# Patient Record
Sex: FEMALE | Race: WHITE | URBAN - METROPOLITAN AREA
[De-identification: names, ages, dates, MRNs, and addresses within clinical notes are randomized per-mention and may not be internally consistent; named-entity substitution may affect disease eponyms.]

---

## 2017-01-11 ENCOUNTER — TRANSFERRED RECORDS (OUTPATIENT)
Dept: HEALTH INFORMATION MANAGEMENT | Facility: CLINIC | Age: 57
End: 2017-01-11

## 2017-04-19 ENCOUNTER — TRANSFERRED RECORDS (OUTPATIENT)
Dept: HEALTH INFORMATION MANAGEMENT | Facility: CLINIC | Age: 57
End: 2017-04-19

## 2017-06-10 ENCOUNTER — HEALTH MAINTENANCE LETTER (OUTPATIENT)
Age: 57
End: 2017-06-10

## 2017-08-14 ENCOUNTER — PRE VISIT (OUTPATIENT)
Dept: ORTHOPEDICS | Facility: CLINIC | Age: 57
End: 2017-08-14

## 2017-08-14 NOTE — TELEPHONE ENCOUNTER
1.  Date/reason for appt:  10/26/17   Right Hip Pain    2.  Referring provider:  Self    3.  Call to patient (Yes / No - short description):  No, transferred from .  Previously seen at [facility] Dr Alonso - records, PT, MRI; no previous surgery on right hip    Emailed LEANNE mark [email or web address]

## 2017-08-23 NOTE — TELEPHONE ENCOUNTER
Records received from [facility].   Included  Office notes: 5/24/16, 6/22/16, 1/11/17  PT/OT notes: 4/19/17, 2/24/17, 2/21/17, 2/14/17, 2/9/17, 2/3/17  Radiology reports: right hip 6/8/16  MRI lumbar 6/8/16, 5/24/16

## 2017-08-28 NOTE — TELEPHONE ENCOUNTER
Received imaging disc [facility], sent to [facility]    MRI Right Hip

## 2017-10-25 DIAGNOSIS — M25.551 HIP PAIN, RIGHT: Primary | ICD-10-CM

## 2017-10-25 ASSESSMENT — ENCOUNTER SYMPTOMS
STIFFNESS: 1
BACK PAIN: 1
JOINT SWELLING: 0
MYALGIAS: 0
MUSCLE WEAKNESS: 0
NECK PAIN: 0
MUSCLE CRAMPS: 0
ARTHRALGIAS: 1

## 2017-10-25 ASSESSMENT — ACTIVITIES OF DAILY LIVING (ADL)
ADL_SUM: 36
ADL_MEAN: 2.11
ADL_SUBSCALE_SCORE: 47.05

## 2017-10-25 ASSESSMENT — HOOS S4: HOW SEVERE IS YOUR HIP JOINT STIFFNESS AFTER FIRST WAKENING IN THE MORNING?: SEVERE

## 2017-10-26 ENCOUNTER — OFFICE VISIT (OUTPATIENT)
Dept: ORTHOPEDICS | Facility: CLINIC | Age: 57
End: 2017-10-26

## 2017-10-26 VITALS — HEIGHT: 69 IN | BODY MASS INDEX: 28.38 KG/M2 | WEIGHT: 191.6 LBS

## 2017-10-26 DIAGNOSIS — M16.11 PRIMARY OSTEOARTHRITIS OF RIGHT HIP: Primary | ICD-10-CM

## 2017-10-26 NOTE — PROGRESS NOTES
Lorie returns today for her hips. She is five years s/p left total hip five years ago and reports that she is doing well. She reports that her right hip has become severely painful over the last few years and is now interfering with activities of daily living. She has tried activity modification and Rx dose NSAIDs as well as a medrol dose pack. She has been working with a physical therapist and with a HEP for the last eight months. She had an outside MRI this showed end-stage arthrosis. She is here to discuss total hip.     On physical examination she rises a little slowly and walks with an antalgic gait favoring the right side. Right hip ROM is moderately irritated and this reproduces her symptoms.    We reviewed her radiographs together and these show end-stage osteoarthritis of the right hip. The left side shows normal progression for a total hip arthroplasty without evidence of loosening or subsidence.     Assessment: left hip doing well. Right hip severely painful despite appropriate non-operative management. We spent twenty minutes discussing total hip arthroplasty.  We discussed the implants, the procedure, the risks and benefits, and the post-operative course.  We discussed blood clots, blood clots to the lungs, injury to blood vessels and nerves, dislocation, infection, and leg length difference. We discussed that the ipsilateral radicular symptoms would not be expected to change with a total hip. All the patients questions were answered to the best of my ability.    Plan: right total hip    Answers for HPI/ROS submitted by the patient on 10/25/2017   General Symptoms: No  Skin Symptoms: No  HENT Symptoms: No  EYE SYMPTOMS: No  HEART SYMPTOMS: No  LUNG SYMPTOMS: No  INTESTINAL SYMPTOMS: No  URINARY SYMPTOMS: No  GYNECOLOGIC SYMPTOMS: No  BREAST SYMPTOMS: No  SKELETAL SYMPTOMS: Yes  BLOOD SYMPTOMS: No  NERVOUS SYSTEM SYMPTOMS: No  MENTAL HEALTH SYMPTOMS: No  Back pain: Yes  Muscle aches: No  Neck pain:  No  Swollen joints: No  Joint pain: Yes  Bone pain: No  Muscle cramps: No  Muscle weakness: No  Joint stiffness: Yes  Bone fracture: No

## 2017-10-26 NOTE — NURSING NOTE
"Reason For Visit:   Chief Complaint   Patient presents with     Consult     Pt. states that she is here today for Right Hip Pain. She had tried Physical Therapy in the past, not effective.  HX: Left VALARIE DOS 3/19/2012.        Pain Assessment  Patient Currently in Pain: Yes  0-10 Pain Scale: 5  Primary Pain Location: Hip  Pain Orientation: Right  Pain Descriptors: Discomfort  Alleviating Factors: Rest, NSAIDS  Aggravating Factors: Movement, Walking, Standing               HEIGHT: 5' 9\", WEIGHT: 191 lbs 9.6 oz, BMI: Body mass index is 28.29 kg/(m^2).      Current Outpatient Prescriptions   Medication Sig Dispense Refill     clindamycin (CLEOCIN) 300 MG capsule Take 2 capsules by mouth See Admin Instructions. Take 2 capsules one hour before dental procedure 4 capsule 0     metroNIDAZOLE (METROCREAM) 0.75 % cream Apply  topically 2 times daily. 45 g 4     sertraline (ZOLOFT) 100 MG tablet Take 1 tablet by mouth daily. 90 tablet 3     ranitidine (ZANTAC) 150 MG tablet Take 1 tablet by mouth 2 times daily. 180 tablet 0     Glucosamine-Chondroitin (GLUCOSAMINE CHONDR COMPLEX PO) Take 1,000 mg by mouth daily.       Cholecalciferol (VITAMIN D) 1000 UNIT capsule Take 1 capsule by mouth daily.       ACETAMINOPHEN PO Take  by mouth. 1000mg tid prn       Calcium Carbonate-Vitamin D (CALCIUM 600 + D OR) Take 1 tablet by mouth daily.       Krill Oil CAPS Take 1 capsule by mouth daily.            Allergies   Allergen Reactions     Darvocet [Propoxyphene N-Apap] Hives     Penicillins Hives     Sulfa Drugs Rash     Codeine Sulfate Nausea and Vomiting     Vicodin [Hydrocodone-Acetaminophen] Nausea and Vomiting               "

## 2017-10-26 NOTE — MR AVS SNAPSHOT
After Visit Summary   10/26/2017    Lorie Krishnamurthy    MRN: [id number]           Patient Information     Date Of Birth          1960        Visit Information        Provider Department      10/26/2017 1:30 PM Sonny Betancourt MD [facility]        Today's Diagnoses     Primary osteoarthritis of right hip    -  1       Follow-ups after your visit        Future tests that were ordered for you today     Open Future Orders        Priority Expected Expires Ordered    ABO/Rh type and screen Routine  4/25/2018 10/26/2017            Who to contact     Please call your clinic at [phone] to:    Ask questions about your health    Make or cancel appointments    Discuss your medicines    Learn about your test results    Speak to your doctor   If you have compliments or concerns about an experience at your clinic, or if you wish to file a complaint, please contact [facility] Patient Relations at [phone] or email us at [email or web address]         Additional Information About Your Visit        MyChart Information     [employer] gives you secure access to your electronic health record. If you see a primary care provider, you can also send messages to your care team and make appointments. If you have questions, please call your primary care clinic.  If you do not have a primary care provider, please call [phone] and they will assist you.      QRcao is an electronic gateway that provides easy, online access to your medical records. With QR[employer]o, you can request a clinic appointment, read your test results, renew a prescription or communicate with your care team.     To access your existing account, please contact your [facility] or call [phone] for assistance.        Care EveryWhere ID     This is your Care EveryWhere ID. This could be used by other organizations to access your [facility]  "records  [id number]        Your Vitals Were     Height BMI (Body Mass Index)                1.753 m (5' 9\") 28.29 kg/m2           Blood Pressure from Last 3 Encounters:   11/30/12 116/69   05/02/12 119/69   04/13/12 121/86    Weight from Last 3 Encounters:   10/26/17 86.9 kg (191 lb 9.6 oz)   11/30/12 78.7 kg (173 lb 6.4 oz)   05/02/12 79.4 kg (175 lb)              We Performed the Following     Shahnaz-Operative Worksheet        Primary Care Provider Office Phone # Fax #    Rhiannon Ivan Smith [phone]       [facility] [address] [facility] [address]        Equal Access to Services     NIKKY QUAN : Romi plascencia Sotamika, waaxda luqadaha, qaybta kaalmada ale, dary louie . So [facility] [phone].    ATENCIÓN: Si habla español, tiene a becerra disposición servicios gratuitos de asistencia lingüística. Llame al [phone].    We comply with applicable federal civil rights laws and [place] laws. We do not discriminate on the basis of race, color, national origin, age, disability, sex, sexual orientation, or gender identity.            Thank you!     Thank you for choosing [facility]  for your care. Our goal is always to provide you with excellent care. Hearing back from our patients is one way we can continue to improve our services. Please take a few minutes to complete the written survey that you may receive in the mail after your visit with us. Thank you!             Your Updated Medication List - Protect others around you: Learn how to safely use, store and throw away your medicines at www.disposemymeds.org.          This list is accurate as of: 10/26/17  3:42 PM.  Always use your most recent med list.                   Brand Name Dispense Instructions for use Diagnosis    ACETAMINOPHEN PO      Take  by mouth. 1000mg tid prn        CALCIUM 600 + D PO      Take 1 tablet by mouth daily.        clindamycin 300 MG capsule "    CLEOCIN    4 capsule    Take 2 capsules by mouth See Admin Instructions. Take 2 capsules one hour before dental procedure    S/P hip replacement       GLUCOSAMINE CHONDR COMPLEX PO      Take 1,000 mg by mouth daily.        Krill Oil Caps      Take 1 capsule by mouth daily.        metroNIDAZOLE 0.75 % cream    METROCREAM    45 g    Apply  topically 2 times daily.    Acne rosacea       ranitidine 150 MG tablet    ZANTAC    180 tablet    Take 1 tablet by mouth 2 times daily.    History of total hip arthroplasty       sertraline 100 MG tablet    ZOLOFT    90 tablet    Take 1 tablet by mouth daily.    Generalized anxiety disorder       vitamin D 1000 UNITS capsule      Take 1 capsule by mouth daily.

## 2017-10-26 NOTE — NURSING NOTE
Teaching Flowsheet   Relevant Diagnosis: Right hip osteoarthritis.  Teaching Topic: Right total hip arthroplasty     Person(s) involved in teaching:   Patient     Motivation Level:  Asks Questions: Yes  Eager to Learn: Yes  Cooperative: Yes  Receptive (willing/able to accept information): Yes  Any cultural factors/Worship beliefs that may influence understanding or compliance? No     Patient demonstrates understanding of the following:  Reason for the appointment, diagnosis and treatment plan: Yes  Knowledge of proper use of medications and conditions for which they are ordered (with special attention to potential side effects or drug interactions): Yes  Which situations necessitate calling provider and whom to contact: Yes     Teaching Concerns Addressed:      Proper use and care of assistive device (medical equip, care aids, etc.): Yes  Nutritional needs and diet plan: NA  Pain management techniques: Yes  Wound Care: Yes  How and/when to access community resources: Yes     Instructional Materials Used/Given: Preoperative/postoperative teaching packet, surgical soap, dental card, blood bank order form, total joint booklet    Health history negative per patient.

## 2017-10-26 NOTE — LETTER
10/26/2017       RE: Lorie Krishnamurthy  [address]  [place] [address]     Dear Colleague,    Thank you for referring your patient, Lorie Krishnamurthy, to the [facility] at [facility]. Please see a copy of my visit note below.    Lorie returns today for her hips. She is five years s/p left total hip five years ago and reports that she is doing well. She reports that her right hip has become severely painful over the last few years and is now interfering with activities of daily living. She has tried activity modification and Rx dose NSAIDs as well as a medrol dose pack. She has been working with a physical therapist and with a HEP for the last eight months. She had an outside MRI this showed end-stage arthrosis. She is here to discuss total hip.     On physical examination she rises a little slowly and walks with an antalgic gait favoring the right side. Right hip ROM is moderately irritated and this reproduces her symptoms.    We reviewed her radiographs together and these show end-stage osteoarthritis of the right hip. The left side shows normal progression for a total hip arthroplasty without evidence of loosening or subsidence.     Assessment: left hip doing well. Right hip severely painful despite appropriate non-operative management. We spent twenty minutes discussing total hip arthroplasty.  We discussed the implants, the procedure, the risks and benefits, and the post-operative course.  We discussed blood clots, blood clots to the lungs, injury to blood vessels and nerves, dislocation, infection, and leg length difference. We discussed that the ipsilateral radicular symptoms would not be expected to change with a total hip. All the patients questions were answered to the best of my ability.    Plan: right total hip    Sincerely,    Sonny Betancourt MD

## 2017-10-31 ENCOUNTER — TELEPHONE (OUTPATIENT)
Dept: ORTHOPEDICS | Facility: CLINIC | Age: 57
End: 2017-10-31

## 2017-11-10 DIAGNOSIS — M16.11 PRIMARY OSTEOARTHRITIS OF RIGHT HIP: Primary | ICD-10-CM

## 2018-01-16 DIAGNOSIS — M16.11 PRIMARY OSTEOARTHRITIS OF RIGHT HIP: ICD-10-CM

## 2018-01-16 PROCEDURE — 36415 COLL VENOUS BLD VENIPUNCTURE: CPT | Performed by: ORTHOPAEDIC SURGERY

## 2018-01-16 PROCEDURE — 86922 COMPATIBILITY TEST ANTIGLOB: CPT | Performed by: ORTHOPAEDIC SURGERY

## 2018-01-16 PROCEDURE — 86850 RBC ANTIBODY SCREEN: CPT | Performed by: ORTHOPAEDIC SURGERY

## 2018-01-16 PROCEDURE — 86901 BLOOD TYPING SEROLOGIC RH(D): CPT | Performed by: ORTHOPAEDIC SURGERY

## 2018-01-16 PROCEDURE — 86900 BLOOD TYPING SEROLOGIC ABO: CPT | Performed by: ORTHOPAEDIC SURGERY

## 2018-01-17 ENCOUNTER — APPOINTMENT (OUTPATIENT)
Dept: GENERAL RADIOLOGY | Facility: CLINIC | Age: 58
End: 2018-01-17
Attending: ORTHOPAEDIC SURGERY
Payer: COMMERCIAL

## 2018-01-17 ENCOUNTER — ANESTHESIA EVENT (OUTPATIENT)
Dept: SURGERY | Facility: CLINIC | Age: 58
End: 2018-01-17
Payer: COMMERCIAL

## 2018-01-17 ENCOUNTER — ANESTHESIA (OUTPATIENT)
Dept: SURGERY | Facility: CLINIC | Age: 58
End: 2018-01-17
Payer: COMMERCIAL

## 2018-01-17 ENCOUNTER — APPOINTMENT (OUTPATIENT)
Dept: PHYSICAL THERAPY | Facility: CLINIC | Age: 58
End: 2018-01-17
Attending: ORTHOPAEDIC SURGERY
Payer: COMMERCIAL

## 2018-01-17 ENCOUNTER — HOSPITAL ENCOUNTER (INPATIENT)
Facility: CLINIC | Age: 58
LOS: 2 days | Discharge: HOME HEALTH | End: 2018-01-19
Attending: ORTHOPAEDIC SURGERY | Admitting: ORTHOPAEDIC SURGERY
Payer: COMMERCIAL

## 2018-01-17 DIAGNOSIS — Z98.890 STATUS POST HIP SURGERY: Primary | ICD-10-CM

## 2018-01-17 LAB
ABO + RH BLD: ABNORMAL
ABO + RH BLD: ABNORMAL
ALBUMIN UR-MCNC: NEGATIVE MG/DL
APPEARANCE UR: CLEAR
BACTERIA #/AREA URNS HPF: ABNORMAL /HPF
BILIRUB UR QL STRIP: NEGATIVE
BLD GP AB SCN SERPL QL: ABNORMAL
BLD PROD TYP BPU: ABNORMAL
BLOOD BANK CMNT PATIENT-IMP: ABNORMAL
BLOOD BANK CMNT PATIENT-IMP: ABNORMAL
COLOR UR AUTO: ABNORMAL
CREAT SERPL-MCNC: 0.57 MG/DL (ref 0.52–1.04)
GFR SERPL CREATININE-BSD FRML MDRD: >90 ML/MIN/1.7M2
GLUCOSE BLDC GLUCOMTR-MCNC: 103 MG/DL (ref 70–99)
GLUCOSE UR STRIP-MCNC: NEGATIVE MG/DL
HGB UR QL STRIP: NEGATIVE
KETONES UR STRIP-MCNC: NEGATIVE MG/DL
LEUKOCYTE ESTERASE UR QL STRIP: ABNORMAL
MUCOUS THREADS #/AREA URNS LPF: PRESENT /LPF
NITRATE UR QL: NEGATIVE
NUM BPU REQUESTED: 2
PH UR STRIP: 6.5 PH (ref 5–7)
PLATELET # BLD AUTO: 187 10E9/L (ref 150–450)
RBC #/AREA URNS AUTO: <1 /HPF (ref 0–2)
SOURCE: ABNORMAL
SP GR UR STRIP: 1 (ref 1–1.03)
SPECIMEN EXP DATE BLD: ABNORMAL
SQUAMOUS #/AREA URNS AUTO: 1 /HPF (ref 0–1)
UROBILINOGEN UR STRIP-MCNC: NORMAL MG/DL (ref 0–2)
WBC #/AREA URNS AUTO: 2 /HPF (ref 0–2)

## 2018-01-17 PROCEDURE — 25000566 ZZH SEVOFLURANE, EA 15 MIN: Performed by: ORTHOPAEDIC SURGERY

## 2018-01-17 PROCEDURE — 25000132 ZZH RX MED GY IP 250 OP 250 PS 637: Performed by: PHYSICIAN ASSISTANT

## 2018-01-17 PROCEDURE — 00000146 ZZHCL STATISTIC GLUCOSE BY METER IP

## 2018-01-17 PROCEDURE — 25000125 ZZHC RX 250: Performed by: ORTHOPAEDIC SURGERY

## 2018-01-17 PROCEDURE — 86900 BLOOD TYPING SEROLOGIC ABO: CPT | Performed by: ORTHOPAEDIC SURGERY

## 2018-01-17 PROCEDURE — 36415 COLL VENOUS BLD VENIPUNCTURE: CPT | Performed by: ORTHOPAEDIC SURGERY

## 2018-01-17 PROCEDURE — 25000128 H RX IP 250 OP 636: Performed by: ORTHOPAEDIC SURGERY

## 2018-01-17 PROCEDURE — 97116 GAIT TRAINING THERAPY: CPT | Mod: GP | Performed by: PHYSICAL THERAPIST

## 2018-01-17 PROCEDURE — C1713 ANCHOR/SCREW BN/BN,TIS/BN: HCPCS | Performed by: ORTHOPAEDIC SURGERY

## 2018-01-17 PROCEDURE — 71000014 ZZH RECOVERY PHASE 1 LEVEL 2 FIRST HR: Performed by: ORTHOPAEDIC SURGERY

## 2018-01-17 PROCEDURE — 82565 ASSAY OF CREATININE: CPT | Performed by: ORTHOPAEDIC SURGERY

## 2018-01-17 PROCEDURE — 37000008 ZZH ANESTHESIA TECHNICAL FEE, 1ST 30 MIN: Performed by: ORTHOPAEDIC SURGERY

## 2018-01-17 PROCEDURE — 25000132 ZZH RX MED GY IP 250 OP 250 PS 637: Performed by: ANESTHESIOLOGY

## 2018-01-17 PROCEDURE — 25000128 H RX IP 250 OP 636: Performed by: NURSE ANESTHETIST, CERTIFIED REGISTERED

## 2018-01-17 PROCEDURE — 97161 PT EVAL LOW COMPLEX 20 MIN: CPT | Mod: GP | Performed by: PHYSICAL THERAPIST

## 2018-01-17 PROCEDURE — 97110 THERAPEUTIC EXERCISES: CPT | Mod: GP | Performed by: PHYSICAL THERAPIST

## 2018-01-17 PROCEDURE — 81001 URINALYSIS AUTO W/SCOPE: CPT | Performed by: ORTHOPAEDIC SURGERY

## 2018-01-17 PROCEDURE — 97530 THERAPEUTIC ACTIVITIES: CPT | Mod: GP | Performed by: PHYSICAL THERAPIST

## 2018-01-17 PROCEDURE — 27210794 ZZH OR GENERAL SUPPLY STERILE: Performed by: ORTHOPAEDIC SURGERY

## 2018-01-17 PROCEDURE — 12000001 ZZH R&B MED SURG/OB UMMC

## 2018-01-17 PROCEDURE — 40000986 XR PELVIS AND HIP PORTABLE RIGHT 2 VIEWS

## 2018-01-17 PROCEDURE — 36000064 ZZH SURGERY LEVEL 4 EA 15 ADDTL MIN - UMMC: Performed by: ORTHOPAEDIC SURGERY

## 2018-01-17 PROCEDURE — 40000193 ZZH STATISTIC PT WARD VISIT: Performed by: PHYSICAL THERAPIST

## 2018-01-17 PROCEDURE — 86850 RBC ANTIBODY SCREEN: CPT | Performed by: ORTHOPAEDIC SURGERY

## 2018-01-17 PROCEDURE — 40000170 ZZH STATISTIC PRE-PROCEDURE ASSESSMENT II: Performed by: ORTHOPAEDIC SURGERY

## 2018-01-17 PROCEDURE — 99207 ZZC CDG-DOWN CODE MED NECESSITY: CPT | Performed by: INTERNAL MEDICINE

## 2018-01-17 PROCEDURE — 37000009 ZZH ANESTHESIA TECHNICAL FEE, EACH ADDTL 15 MIN: Performed by: ORTHOPAEDIC SURGERY

## 2018-01-17 PROCEDURE — C9399 UNCLASSIFIED DRUGS OR BIOLOG: HCPCS | Performed by: NURSE ANESTHETIST, CERTIFIED REGISTERED

## 2018-01-17 PROCEDURE — 85049 AUTOMATED PLATELET COUNT: CPT | Performed by: ORTHOPAEDIC SURGERY

## 2018-01-17 PROCEDURE — 71000015 ZZH RECOVERY PHASE 1 LEVEL 2 EA ADDTL HR: Performed by: ORTHOPAEDIC SURGERY

## 2018-01-17 PROCEDURE — 25000132 ZZH RX MED GY IP 250 OP 250 PS 637: Performed by: ORTHOPAEDIC SURGERY

## 2018-01-17 PROCEDURE — 86901 BLOOD TYPING SEROLOGIC RH(D): CPT | Performed by: ORTHOPAEDIC SURGERY

## 2018-01-17 PROCEDURE — 0SR901A REPLACEMENT OF RIGHT HIP JOINT WITH METAL SYNTHETIC SUBSTITUTE, UNCEMENTED, OPEN APPROACH: ICD-10-PCS | Performed by: ORTHOPAEDIC SURGERY

## 2018-01-17 PROCEDURE — C1776 JOINT DEVICE (IMPLANTABLE): HCPCS | Performed by: ORTHOPAEDIC SURGERY

## 2018-01-17 PROCEDURE — 36000062 ZZH SURGERY LEVEL 4 1ST 30 MIN - UMMC: Performed by: ORTHOPAEDIC SURGERY

## 2018-01-17 PROCEDURE — 25000128 H RX IP 250 OP 636: Performed by: ANESTHESIOLOGY

## 2018-01-17 PROCEDURE — 25000125 ZZHC RX 250: Performed by: NURSE ANESTHETIST, CERTIFIED REGISTERED

## 2018-01-17 DEVICE — IMP HEAD FEMORAL SNR COBALT 32MM +0 71303200: Type: IMPLANTABLE DEVICE | Site: HIP | Status: FUNCTIONAL

## 2018-01-17 DEVICE — IMP SHELL SNR ACET R3 3H 50MM 71335550: Type: IMPLANTABLE DEVICE | Site: HIP | Status: FUNCTIONAL

## 2018-01-17 DEVICE — IMP LINER SNR ACET R3 XLPE 0DEG 32X50MM 71339550: Type: IMPLANTABLE DEVICE | Site: HIP | Status: FUNCTIONAL

## 2018-01-17 DEVICE — IMP SCR ACET SNN SPHERICAL HEAD 6.5X30MM 71332530: Type: IMPLANTABLE DEVICE | Site: HIP | Status: FUNCTIONAL

## 2018-01-17 DEVICE — IMP SCR ACET SNN SPHERICAL HEAD 6.5X35MM 71332535: Type: IMPLANTABLE DEVICE | Site: HIP | Status: FUNCTIONAL

## 2018-01-17 DEVICE — IMP STEM SNN HIGH OFFSET SZ 6 71356106: Type: IMPLANTABLE DEVICE | Site: HIP | Status: FUNCTIONAL

## 2018-01-17 RX ORDER — PROCHLORPERAZINE MALEATE 5 MG
10 TABLET ORAL EVERY 6 HOURS PRN
Status: DISCONTINUED | OUTPATIENT
Start: 2018-01-17 | End: 2018-01-19 | Stop reason: HOSPADM

## 2018-01-17 RX ORDER — EPHEDRINE SULFATE 50 MG/ML
INJECTION, SOLUTION INTRAMUSCULAR; INTRAVENOUS; SUBCUTANEOUS PRN
Status: DISCONTINUED | OUTPATIENT
Start: 2018-01-17 | End: 2018-01-17

## 2018-01-17 RX ORDER — PROPOFOL 10 MG/ML
INJECTION, EMULSION INTRAVENOUS CONTINUOUS PRN
Status: DISCONTINUED | OUTPATIENT
Start: 2018-01-17 | End: 2018-01-17

## 2018-01-17 RX ORDER — ACETAMINOPHEN 325 MG/1
650 TABLET ORAL EVERY 4 HOURS PRN
Status: DISCONTINUED | OUTPATIENT
Start: 2018-01-20 | End: 2018-01-19 | Stop reason: HOSPADM

## 2018-01-17 RX ORDER — ACETAMINOPHEN 325 MG/1
975 TABLET ORAL EVERY 8 HOURS
Status: DISCONTINUED | OUTPATIENT
Start: 2018-01-17 | End: 2018-01-19 | Stop reason: HOSPADM

## 2018-01-17 RX ORDER — CLINDAMYCIN PHOSPHATE 900 MG/50ML
900 INJECTION, SOLUTION INTRAVENOUS
Status: DISCONTINUED | OUTPATIENT
Start: 2018-01-17 | End: 2018-01-17 | Stop reason: HOSPADM

## 2018-01-17 RX ORDER — FENTANYL CITRATE 50 UG/ML
INJECTION, SOLUTION INTRAMUSCULAR; INTRAVENOUS PRN
Status: DISCONTINUED | OUTPATIENT
Start: 2018-01-17 | End: 2018-01-17

## 2018-01-17 RX ORDER — CELECOXIB 200 MG/1
200 CAPSULE ORAL ONCE
Status: COMPLETED | OUTPATIENT
Start: 2018-01-17 | End: 2018-01-17

## 2018-01-17 RX ORDER — SODIUM CHLORIDE, SODIUM LACTATE, POTASSIUM CHLORIDE, CALCIUM CHLORIDE 600; 310; 30; 20 MG/100ML; MG/100ML; MG/100ML; MG/100ML
INJECTION, SOLUTION INTRAVENOUS CONTINUOUS
Status: DISCONTINUED | OUTPATIENT
Start: 2018-01-17 | End: 2018-01-18

## 2018-01-17 RX ORDER — SODIUM CHLORIDE, SODIUM LACTATE, POTASSIUM CHLORIDE, CALCIUM CHLORIDE 600; 310; 30; 20 MG/100ML; MG/100ML; MG/100ML; MG/100ML
INJECTION, SOLUTION INTRAVENOUS CONTINUOUS
Status: DISCONTINUED | OUTPATIENT
Start: 2018-01-17 | End: 2018-01-17 | Stop reason: HOSPADM

## 2018-01-17 RX ORDER — DEXAMETHASONE SODIUM PHOSPHATE 4 MG/ML
INJECTION, SOLUTION INTRA-ARTICULAR; INTRALESIONAL; INTRAMUSCULAR; INTRAVENOUS; SOFT TISSUE PRN
Status: DISCONTINUED | OUTPATIENT
Start: 2018-01-17 | End: 2018-01-17

## 2018-01-17 RX ORDER — CLINDAMYCIN PHOSPHATE 900 MG/50ML
900 INJECTION, SOLUTION INTRAVENOUS SEE ADMIN INSTRUCTIONS
Status: DISCONTINUED | OUTPATIENT
Start: 2018-01-17 | End: 2018-01-17 | Stop reason: HOSPADM

## 2018-01-17 RX ORDER — ATORVASTATIN CALCIUM 20 MG/1
40 TABLET, FILM COATED ORAL DAILY
Status: DISCONTINUED | OUTPATIENT
Start: 2018-01-17 | End: 2018-01-19 | Stop reason: HOSPADM

## 2018-01-17 RX ORDER — ONDANSETRON 4 MG/1
4 TABLET, ORALLY DISINTEGRATING ORAL EVERY 6 HOURS PRN
Status: DISCONTINUED | OUTPATIENT
Start: 2018-01-17 | End: 2018-01-19 | Stop reason: HOSPADM

## 2018-01-17 RX ORDER — ACETAMINOPHEN 325 MG/1
975 TABLET ORAL ONCE
Status: COMPLETED | OUTPATIENT
Start: 2018-01-17 | End: 2018-01-17

## 2018-01-17 RX ORDER — AMOXICILLIN 250 MG
1-2 CAPSULE ORAL 2 TIMES DAILY
Status: DISCONTINUED | OUTPATIENT
Start: 2018-01-17 | End: 2018-01-19 | Stop reason: HOSPADM

## 2018-01-17 RX ORDER — ONDANSETRON 4 MG/1
4 TABLET, ORALLY DISINTEGRATING ORAL EVERY 30 MIN PRN
Status: DISCONTINUED | OUTPATIENT
Start: 2018-01-17 | End: 2018-01-17 | Stop reason: HOSPADM

## 2018-01-17 RX ORDER — OXYCODONE HYDROCHLORIDE 5 MG/1
5-10 TABLET ORAL
Status: DISCONTINUED | OUTPATIENT
Start: 2018-01-17 | End: 2018-01-19 | Stop reason: HOSPADM

## 2018-01-17 RX ORDER — DIPHENHYDRAMINE HYDROCHLORIDE 50 MG/ML
25 INJECTION INTRAMUSCULAR; INTRAVENOUS EVERY 6 HOURS PRN
Status: DISCONTINUED | OUTPATIENT
Start: 2018-01-17 | End: 2018-01-19 | Stop reason: HOSPADM

## 2018-01-17 RX ORDER — NALOXONE HYDROCHLORIDE 0.4 MG/ML
.1-.4 INJECTION, SOLUTION INTRAMUSCULAR; INTRAVENOUS; SUBCUTANEOUS
Status: DISCONTINUED | OUTPATIENT
Start: 2018-01-17 | End: 2018-01-17 | Stop reason: HOSPADM

## 2018-01-17 RX ORDER — GABAPENTIN 300 MG/1
300 CAPSULE ORAL ONCE
Status: COMPLETED | OUTPATIENT
Start: 2018-01-17 | End: 2018-01-17

## 2018-01-17 RX ORDER — ONDANSETRON 2 MG/ML
INJECTION INTRAMUSCULAR; INTRAVENOUS PRN
Status: DISCONTINUED | OUTPATIENT
Start: 2018-01-17 | End: 2018-01-17

## 2018-01-17 RX ORDER — PROPOFOL 10 MG/ML
INJECTION, EMULSION INTRAVENOUS PRN
Status: DISCONTINUED | OUTPATIENT
Start: 2018-01-17 | End: 2018-01-17

## 2018-01-17 RX ORDER — GABAPENTIN 300 MG/1
300 CAPSULE ORAL ONCE
Status: DISCONTINUED | OUTPATIENT
Start: 2018-01-17 | End: 2018-01-17 | Stop reason: HOSPADM

## 2018-01-17 RX ORDER — ONDANSETRON 2 MG/ML
4 INJECTION INTRAMUSCULAR; INTRAVENOUS EVERY 30 MIN PRN
Status: DISCONTINUED | OUTPATIENT
Start: 2018-01-17 | End: 2018-01-17 | Stop reason: HOSPADM

## 2018-01-17 RX ORDER — HYDROMORPHONE HYDROCHLORIDE 1 MG/ML
.3-.5 INJECTION, SOLUTION INTRAMUSCULAR; INTRAVENOUS; SUBCUTANEOUS
Status: DISCONTINUED | OUTPATIENT
Start: 2018-01-17 | End: 2018-01-19 | Stop reason: HOSPADM

## 2018-01-17 RX ORDER — MEPERIDINE HYDROCHLORIDE 25 MG/ML
12.5 INJECTION INTRAMUSCULAR; INTRAVENOUS; SUBCUTANEOUS
Status: DISCONTINUED | OUTPATIENT
Start: 2018-01-17 | End: 2018-01-17 | Stop reason: HOSPADM

## 2018-01-17 RX ORDER — CLINDAMYCIN PHOSPHATE 900 MG/50ML
900 INJECTION, SOLUTION INTRAVENOUS EVERY 8 HOURS
Status: COMPLETED | OUTPATIENT
Start: 2018-01-17 | End: 2018-01-18

## 2018-01-17 RX ORDER — FENTANYL CITRATE 50 UG/ML
25-50 INJECTION, SOLUTION INTRAMUSCULAR; INTRAVENOUS EVERY 5 MIN PRN
Status: DISCONTINUED | OUTPATIENT
Start: 2018-01-17 | End: 2018-01-17 | Stop reason: HOSPADM

## 2018-01-17 RX ORDER — NALOXONE HYDROCHLORIDE 0.4 MG/ML
.1-.4 INJECTION, SOLUTION INTRAMUSCULAR; INTRAVENOUS; SUBCUTANEOUS
Status: DISCONTINUED | OUTPATIENT
Start: 2018-01-17 | End: 2018-01-19 | Stop reason: HOSPADM

## 2018-01-17 RX ORDER — METOCLOPRAMIDE 10 MG/1
10 TABLET ORAL EVERY 6 HOURS PRN
Status: DISCONTINUED | OUTPATIENT
Start: 2018-01-17 | End: 2018-01-19 | Stop reason: HOSPADM

## 2018-01-17 RX ORDER — LIDOCAINE 40 MG/G
CREAM TOPICAL
Status: DISCONTINUED | OUTPATIENT
Start: 2018-01-17 | End: 2018-01-19 | Stop reason: HOSPADM

## 2018-01-17 RX ORDER — DIPHENHYDRAMINE HCL 25 MG
25 CAPSULE ORAL EVERY 6 HOURS PRN
Status: DISCONTINUED | OUTPATIENT
Start: 2018-01-17 | End: 2018-01-19 | Stop reason: HOSPADM

## 2018-01-17 RX ORDER — ONDANSETRON 2 MG/ML
4 INJECTION INTRAMUSCULAR; INTRAVENOUS EVERY 6 HOURS PRN
Status: DISCONTINUED | OUTPATIENT
Start: 2018-01-17 | End: 2018-01-19 | Stop reason: HOSPADM

## 2018-01-17 RX ORDER — GLYCOPYRROLATE 0.2 MG/ML
INJECTION, SOLUTION INTRAMUSCULAR; INTRAVENOUS PRN
Status: DISCONTINUED | OUTPATIENT
Start: 2018-01-17 | End: 2018-01-17

## 2018-01-17 RX ORDER — BISACODYL 10 MG
10 SUPPOSITORY, RECTAL RECTAL DAILY
Status: DISCONTINUED | OUTPATIENT
Start: 2018-01-18 | End: 2018-01-19 | Stop reason: HOSPADM

## 2018-01-17 RX ORDER — METOCLOPRAMIDE HYDROCHLORIDE 5 MG/ML
10 INJECTION INTRAMUSCULAR; INTRAVENOUS EVERY 6 HOURS PRN
Status: DISCONTINUED | OUTPATIENT
Start: 2018-01-17 | End: 2018-01-19 | Stop reason: HOSPADM

## 2018-01-17 RX ORDER — SODIUM CHLORIDE, SODIUM LACTATE, POTASSIUM CHLORIDE, CALCIUM CHLORIDE 600; 310; 30; 20 MG/100ML; MG/100ML; MG/100ML; MG/100ML
INJECTION, SOLUTION INTRAVENOUS CONTINUOUS PRN
Status: DISCONTINUED | OUTPATIENT
Start: 2018-01-17 | End: 2018-01-17

## 2018-01-17 RX ADMIN — PROPOFOL 50 MCG/KG/MIN: 10 INJECTION, EMULSION INTRAVENOUS at 08:10

## 2018-01-17 RX ADMIN — ROCURONIUM BROMIDE 20 MG: 10 INJECTION INTRAVENOUS at 08:03

## 2018-01-17 RX ADMIN — SODIUM CHLORIDE, POTASSIUM CHLORIDE, SODIUM LACTATE AND CALCIUM CHLORIDE: 600; 310; 30; 20 INJECTION, SOLUTION INTRAVENOUS at 09:00

## 2018-01-17 RX ADMIN — Medication 7.5 MG: at 08:32

## 2018-01-17 RX ADMIN — FENTANYL CITRATE 50 MCG: 50 INJECTION, SOLUTION INTRAMUSCULAR; INTRAVENOUS at 07:32

## 2018-01-17 RX ADMIN — Medication 0.2 MG: at 08:35

## 2018-01-17 RX ADMIN — DEXAMETHASONE SODIUM PHOSPHATE 6 MG: 4 INJECTION, SOLUTION INTRAMUSCULAR; INTRAVENOUS at 07:43

## 2018-01-17 RX ADMIN — SODIUM CHLORIDE 1 G: 9 INJECTION, SOLUTION INTRAVENOUS at 07:41

## 2018-01-17 RX ADMIN — ONDANSETRON 4 MG: 2 INJECTION INTRAMUSCULAR; INTRAVENOUS at 10:30

## 2018-01-17 RX ADMIN — HYDROMORPHONE HYDROCHLORIDE 0.5 MG: 1 INJECTION, SOLUTION INTRAMUSCULAR; INTRAVENOUS; SUBCUTANEOUS at 09:51

## 2018-01-17 RX ADMIN — Medication 5 MG: at 07:52

## 2018-01-17 RX ADMIN — OXYCODONE HYDROCHLORIDE 5 MG: 5 TABLET ORAL at 15:22

## 2018-01-17 RX ADMIN — CLINDAMYCIN PHOSPHATE 900 MG: 18 INJECTION, SOLUTION INTRAVENOUS at 07:41

## 2018-01-17 RX ADMIN — OXYCODONE HYDROCHLORIDE 5 MG: 5 TABLET ORAL at 21:47

## 2018-01-17 RX ADMIN — PROPOFOL 30 MG: 10 INJECTION, EMULSION INTRAVENOUS at 09:08

## 2018-01-17 RX ADMIN — CLINDAMYCIN PHOSPHATE 900 MG: 18 INJECTION, SOLUTION INTRAVENOUS at 15:23

## 2018-01-17 RX ADMIN — PROPOFOL 50 MG: 10 INJECTION, EMULSION INTRAVENOUS at 09:06

## 2018-01-17 RX ADMIN — GABAPENTIN 300 MG: 300 CAPSULE ORAL at 06:24

## 2018-01-17 RX ADMIN — Medication 5 MG: at 08:54

## 2018-01-17 RX ADMIN — Medication 5 MG: at 08:57

## 2018-01-17 RX ADMIN — RANITIDINE 150 MG: 150 TABLET ORAL at 19:33

## 2018-01-17 RX ADMIN — CELECOXIB 200 MG: 200 CAPSULE ORAL at 06:24

## 2018-01-17 RX ADMIN — ONDANSETRON 4 MG: 2 INJECTION INTRAMUSCULAR; INTRAVENOUS at 08:58

## 2018-01-17 RX ADMIN — FENTANYL CITRATE 50 MCG: 50 INJECTION, SOLUTION INTRAMUSCULAR; INTRAVENOUS at 08:25

## 2018-01-17 RX ADMIN — Medication 2.5 MG: at 08:16

## 2018-01-17 RX ADMIN — SUGAMMADEX 200 MG: 100 INJECTION, SOLUTION INTRAVENOUS at 09:35

## 2018-01-17 RX ADMIN — Medication 10 MG: at 08:36

## 2018-01-17 RX ADMIN — ATORVASTATIN CALCIUM 40 MG: 20 TABLET, FILM COATED ORAL at 19:33

## 2018-01-17 RX ADMIN — HYDROMORPHONE HYDROCHLORIDE 0.5 MG: 1 INJECTION, SOLUTION INTRAMUSCULAR; INTRAVENOUS; SUBCUTANEOUS at 09:07

## 2018-01-17 RX ADMIN — FENTANYL CITRATE 50 MCG: 50 INJECTION, SOLUTION INTRAMUSCULAR; INTRAVENOUS at 08:10

## 2018-01-17 RX ADMIN — MIDAZOLAM 2 MG: 1 INJECTION INTRAMUSCULAR; INTRAVENOUS at 07:22

## 2018-01-17 RX ADMIN — FENTANYL CITRATE 50 MCG: 50 INJECTION, SOLUTION INTRAMUSCULAR; INTRAVENOUS at 08:03

## 2018-01-17 RX ADMIN — SODIUM CHLORIDE, POTASSIUM CHLORIDE, SODIUM LACTATE AND CALCIUM CHLORIDE: 600; 310; 30; 20 INJECTION, SOLUTION INTRAVENOUS at 07:24

## 2018-01-17 RX ADMIN — ACETAMINOPHEN 975 MG: 325 TABLET, FILM COATED ORAL at 07:00

## 2018-01-17 RX ADMIN — ACETAMINOPHEN 975 MG: 325 TABLET, FILM COATED ORAL at 15:22

## 2018-01-17 RX ADMIN — Medication 5 MG: at 07:55

## 2018-01-17 RX ADMIN — SENNOSIDES AND DOCUSATE SODIUM 1 TABLET: 8.6; 5 TABLET ORAL at 19:33

## 2018-01-17 RX ADMIN — OXYCODONE HYDROCHLORIDE 5 MG: 5 TABLET ORAL at 11:50

## 2018-01-17 RX ADMIN — PROPOFOL 130 MG: 10 INJECTION, EMULSION INTRAVENOUS at 07:32

## 2018-01-17 RX ADMIN — ROCURONIUM BROMIDE 40 MG: 10 INJECTION INTRAVENOUS at 07:32

## 2018-01-17 RX ADMIN — FENTANYL CITRATE 50 MCG: 50 INJECTION, SOLUTION INTRAMUSCULAR; INTRAVENOUS at 08:24

## 2018-01-17 ASSESSMENT — LIFESTYLE VARIABLES: TOBACCO_USE: 0

## 2018-01-17 NOTE — BRIEF OP NOTE
Orthopedic Brief Operative Note    Pre-operative diagnosis: Right hip Osteoarthritis   Post-operative diagnosis: Same   Procedure: Right total hip arthroplasty   Surgeon: Sonny Betancourt MD    Assistant(s): Tres Bergman MD , Barrett Najera MD, Wojciech Manzanares MD   Anesthesia: General endotracheal anesthesia   Estimated blood loss: 200mL       Total urine output: Not measured   Drains: None   Specimens: None   Implants: See dictated operative report for full details   Findings: See dictated operative report for full details   Complications: None   Disposition: Stable to PACU         Plan:  Weight bearing status: WBAT RLE  Activity:  Posterior hip precautions RLE  Bracing:  Hip abduction pillow overnight  DVT PPx:  Lovenox x2 weeks, then ASA x2 weeks  Antibiotics:  Clindamycin x24h  Pain control:  IV and PO, wean to PO as able  Dressing:  Aquacel to leave in place until POD 7     Discharge plan:  Pending PT, post-op cares. Anticipate 2-3 days  Follow-up:  6 weeks with Dr Serafin Bergman  Orthopaedic Surgery PGY-4  Pager:  [phone]

## 2018-01-17 NOTE — IP AVS SNAPSHOT
[facility] [address]    [address]    [place] [address]    Phone:  [phone]                                       After Visit Summary   1/17/2018    Lorie Krishnamurthy    MRN: [id number]           After Visit Summary Signature Page     I have received my discharge instructions, and my questions have been answered. I have discussed any challenges I see with this plan with the nurse or doctor.    ..........................................................................................................................................  Patient/Patient Representative Signature      ..........................................................................................................................................  Patient Representative Print Name and Relationship to Patient    ..................................................               ................................................  Date                                            Time    ..........................................................................................................................................  Reviewed by Signature/Title    ...................................................              ..............................................  Date                                                            Time

## 2018-01-17 NOTE — PROGRESS NOTES
Ortho Post-op Check    Subjective:  Seen in PACU.  Pain minimal.  No concerns at present.     Objective:   General:  Alert  Respiratory:  Breathing easy  Musculoskeletal:    RLE   Dressing C/D/I   Motor: EHL, TA, FHL, GS 5/5   SILT on SP, DP, S, S, and T nerve territories   Circulation: palpable DP and TP, foot warm       Assessment/Plan:   57 year old female POD 0 s/p R VALARIE.  Doing well.    - Continue current cares.      Tres Bergman MD  Orthopaedic Surgery PGY-4  Pager:  [phone]

## 2018-01-17 NOTE — PLAN OF CARE
Problem: Patient Care Overview  Goal: Plan of Care/Patient Progress Review  Discharge Planner PT   Patient plan for discharge: home w/ OP PT  Current status: PT eval completed. Pt amb 550 ft w/ fww, initially w/ CGA progressing to SBA as pt able to demo steady gait. Pt requires min A to get RLE in/out bed and to maintain post-op precautions. STS performed using fww and SBA.   Barriers to return to prior living situation: decreased ROM and strength, pain, post-op precautions  Recommendations for discharge: home w/ OP PT  Rationale for recommendations: to increase RLE strength and ROM to maximize independence and safety with all functional mobility        Entered by: Clement Hazel 01/17/2018 5:19 PM

## 2018-01-17 NOTE — CONSULTS
[facility] Internal Medicine Consultation    Lorie Krishnamurthy MRN# [id number]   Age: 57 year old YOB: 1960   Date of Admission: 1/17/2018     Reason for consult:  S/P Rt hip surgery        Requesting physician Tres Bergman MD       Level of consult: Consult, follow and place orders           Assessment and Plan:   Assessment:   57 yr old female patient with no past cardi/respiratory history, who is S/P Rt hip arthroplasty    Plan:  S/P Rt Hip Arthroplasty:  Management by primary team. Please see their notes for further details  Continue IV  fluids until able to take oral diet.   Pain control with acetaminophen 975 mg every 8 hrs for 3 days, Oxycodone 5-10 mg every 3 hrs PRN and IV hydromorphone 0.3-0.5 mg q 3 hrs for breakthrough pain   DVT prophylaxis with  SCDs while in hospital, and Lovenox  for 4 weeks  PT/OT as per protocol   Incentive spirometry and aggressive bowel regimen  We will monitor for acute blood loss anemia. Pre op Hgb at 15.3    Resume home medications ( atorvastatin, ranitidine and Zoloft)          Chief Complaint:   S/P Rt hip Arthroplasty      History is obtained from the patient, pre op physician and perioperative notes.    Lorie Krishnamurthy is a pleasant 57 yr old female patient who underwent the above surgery today. The procedure itself was uneventful with estimated blood loss at 200 mls.   `Patient received about 1200 msl of LR  Post operatively, she recovered well. I met patient on 8th floor  Patient denies any chest pain/ SOB  Denies any fevers or chills  Denies nausea, Vomiting or diarrhea  Pain is well controlled   Patient has had left hip arthroplasty before and knows what to expect        Past Medical History:     Past Medical History:   Diagnosis Date     Anesthesia complication     paralyzed but awake during surgery     Depression     One major depressive episode, did not tolerate taking off SSRI     Depression, anxiety              Past Surgical History:     Past Surgical History:    Procedure Laterality Date     ARTHROPLASTY HIP  3/19/2012    Procedure:ARTHROPLASTY HIP; Left Total Hip Arthroplasty; Surgeon:ASHANTI RODRIGUEZ; Location:[facility]     LAMINECTOMY CERIVCAL POSTERIOR ONE LEVEL       LIPOSUCTION (LOCATION)       microdiscectomy[               Social History:     Social History   Substance Use Topics     Smoking status: Former Smoker     Smokeless tobacco: Never Used     Alcohol use Yes      Comment: 2 per week             Family History:   History reviewed. No pertinent family history.          Immunizations:     Immunization History   Administered Date(s) Administered     HEPA 11/30/2012     TDAP Vaccine (Boostrix) 11/30/2012             Allergies:     Allergies   Allergen Reactions     Darvocet [Propoxyphene N-Apap] Hives     Penicillins Hives     Sulfa Drugs Rash     Codeine Sulfate Nausea and Vomiting     Vicodin [Hydrocodone-Acetaminophen] Nausea and Vomiting             Medications:     Prescriptions Prior to Admission   Medication Sig Dispense Refill Last Dose     ATORVASTATIN CALCIUM PO Take 40 mg by mouth daily   1/16/2018 at Unknown time     metroNIDAZOLE (METROCREAM) 0.75 % cream Apply  topically 2 times daily. 45 g 4 1/16/2018 at 0800     sertraline (ZOLOFT) 100 MG tablet Take 1 tablet by mouth daily. (Patient taking differently: Take 50 mg by mouth daily ) 90 tablet 3 1/17/2018 at 0430     ranitidine (ZANTAC) 150 MG tablet Take 1 tablet by mouth 2 times daily. 180 tablet 0 1/16/2018 at 2000     Cholecalciferol (VITAMIN D) 1000 UNIT capsule Take 1 capsule by mouth daily.   1/16/2018 at 0800     Calcium Carbonate-Vitamin D (CALCIUM 600 + D OR) Take 1 tablet by mouth daily.   1/16/2018 at 800     clindamycin (CLEOCIN) 300 MG capsule Take 2 capsules by mouth See Admin Instructions. Take 2 capsules one hour before dental procedure 4 capsule 0 1/2/2018     ACETAMINOPHEN PO Take  by mouth. 1000mg tid prn   1/7/2018             Review of Systems:   A comprehensive review of systems  "was performed and found to be negative except as described in this note         Physical Exam:   Vitals were reviewed  Vital signs stable   Patient Vitals for the past 8 hrs:   BP Temp Temp src Heart Rate Resp SpO2 Height Weight   01/17/18 1135 122/66 - - 67 - - - -   01/17/18 1121 - - - - - 99 % - -   01/17/18 1120 119/66 - - 67 - - - -   01/17/18 1116 117/67 96.3  F (35.7  C) Oral 61 16 - - -   01/17/18 1100 112/66 - - 69 22 99 % - -   01/17/18 1045 109/63 98.1  F (36.7  C) Axillary 70 12 96 % - -   01/17/18 1030 114/64 - - 68 25 99 % - -   01/17/18 1015 110/68 97.9  F (36.6  C) Axillary 71 8 96 % - -   01/17/18 1000 106/75 - - 72 12 100 % - -   01/17/18 0948 114/67 97.9  F (36.6  C) Axillary - 16 99 % - -   01/17/18 0551 120/79 97.9  F (36.6  C) Oral 54 16 99 % 1.727 m (5' 8\") 87.8 kg (193 lb 9 oz)     Constitutional:   awake, alert, cooperative, no apparent distress, and appears stated age     Eyes:   Lids and lashes normal, pupils equal, round and reactive to light, extra ocular muscles intact, sclera clear, conjunctiva normal     ENT:   Normocephalic, without obvious abnormality, atraumatic, sinuses nontender on palpation, external ears without lesions, oral pharynx with moist mucous membranes, tonsils without erythema or exudates, gums normal and good dentition.     Neck:   Supple, symmetrical, trachea midline, no adenopathy, thyroid symmetric, not enlarged and no tenderness, skin normal     Lungs:   No increased work of breathing, good air exchange, clear to auscultation bilaterally, no crackles or wheezing     Cardiovascular:   Normal apical impulse, regular rate and rhythm, normal S1 and S2, no S3 or S4, and no murmur noted     Abdomen:   No scars, normal bowel sounds, soft, non-distended, non-tender, no masses palpated, no hepatosplenomegally     Musculoskeletal:   Rt hip covered with dressings. No drain/  No bleeding or bruising noted  No distal neurovascular deficits      Neurologic:   Awake, alert, " oriented to name, place and time.  Cranial nerves II-XII are grossly intact.  Motor is 5 out of 5 bilaterally.  Cerebellar finger to nose, heel to shin intact.  Sensory is intact.  Babinski down going, Romberg negative, and gait is normal.     Skin:   no bruising or bleeding                                 Data:   BMP    Recent Labs  Lab 01/17/18  1200   CR 0.57     CBC    Recent Labs  Lab 01/17/18  1200        INRNo lab results found in last 7 days.  LFTsNo lab results found in last 7 days.   PANCNo lab results found in last 7 days.      .

## 2018-01-17 NOTE — IP AVS SNAPSHOT
MRN:[id number]                      After Visit Summary   1/17/2018    Lorie Krishnamurthy    MRN: [id number]           Thank you!     Thank you for choosing [facility] for your care. Our goal is always to provide you with excellent care. Hearing back from our patients is one way we can continue to improve our services. Please take a few minutes to complete the written survey that you may receive in the mail after you visit with us. Thank you!        Patient Information     Date Of Birth          1960        Designated Caregiver       Most Recent Value    Caregiver    Will someone help with your care after discharge? yes    Name of designated caregiver      Phone number of caregiver      Caregiver address        About your hospital stay     You were admitted on:  January 17, 2018 You last received care in the:  [facility] 8A    You were discharged on:  January 19, 2018        Reason for your hospital stay       You were admitted to the hospital following your total hip arthroplasty.                  Who to Call     For medical emergencies, please call 911.  For non-urgent questions about your medical care, please call your primary care provider or clinic, None  For questions related to your surgery, please call your surgery clinic        Attending Provider     Provider Specialty    Sonny Betancourt MD Orthopedics       Primary Care Provider Fax #    Physician No Ref-Primary [phone]       When to contact your care team       CALL YOUR PHYSICIAN IF:  -Pain in your hip persists or worsens in the first few days after surgery.  -Excessive redness or drainage of cloudy or bloody material from the wounds (Clear red tinted fluid and some mild drainage should be expected). Drainage of any kind 5 days after surgery should be reported to the doctor.  -You have a temperature elevation greater than 101.5    -You have pain, swelling or redness in your calf.  -You have numbness or weakness in your leg or  foot.      You may contact your physician at [phone] during business hours.    For after hours or weekend calls, you may contact the hospital at [phone] and ask for the on-call orthopedic resident                  After Care Instructions     Activity       Your activity upon discharge will be as tolerated. You must maintain posterior hip precautions to the operative hip (see below) for the next 12 weeks with no exceptions.     POSTERIOR HIP PRECAUTIONS:  1) no hip flexion >90 degrees (no bending down at the waist)  2) no internal rotation past neutral (no pivoting)  3) no adduction past midline (no crossing your legs)            Diet       You may return to your regular, pre-surgery diet unless instructed otherwise by your provider.            Discharge Instructions       FERNANDO/TOTAL HIP ARTHROPLASTY POST OPERATIVE INSTRUCTIONS    A.  COMFORT:  -Swelling: An ice pack can be an effective means of controlling swelling and discomfort. Place a thin towel between your skin and the ice pack and apply to skin for 20 minutes.   -Pain Medication: Take medication as prescribed, but only as often as necessary.  Avoid alcohol and driving if you are taking pain medication.  Remember that Tylenol can be used for pain relief as well, as you wean off the narcotics.  Maximum Tylenol dose for a single day is 4000 mg.            B.  ACTIVITIES:  -Activity: Posterior hip precautions should be maintained on the operative hip for at least 12 weeks.  -Exercises: Point and flex your feet and wiggle your toes, move your ankle around in a [place], to help prevent complications such as blood clotting in your legs.   -Weight bearing status: You may weight bear on the operative leg as tolerated  -Physical therapy: Follow-up with physical therapy as discussed with your provider  -Driving: Driving is NOT permitted until allowed by your provider.  Athletic activities: Athletic activities, such as swimming, bicycling, jogging, running  and stop-and-go-sports should be avoided until allowed by your doctor.  -Return to work: May returned to work when allowed by your provider.       C. INCISION CARE:    -Keep the initial post-op dressing on for 7 days, as discussed above. You may shower 48 hours after surgery, however the dressing on your hip should remain in place during showering. It is acceptable for water to run over the dressing, but you are not to soak or submerge your wound underwater. The steri-strips (small white tape that is directly on the incision areas) should be left on until they fall off or are removed at your first office visit.            Wound care and dressings       You have a clean Aquacel dressing on your surgical wound. This dressing should stay in place for 7 days to allow the incision to heal. After 7 days, you may change the dressing. Please use sterile 4x4 gauze dressings with tape over top to secure the dressing. If the dressing becomes wet or saturated with wound drainage, it is appropriate to change the dressing. If drainage persists from the incision site to the extent that it is frequently saturating the dressing, please contact your clinic nurse.                  Follow-up Appointments     Adult [facility] Follow-up and recommended labs and tests       You are to return to clinic in 2 weeks for wound check with the clinic nurse. Approximately 6 weeks after your surgery, you will be scheduled for an appointment with Dr. Betancourt. At this time, AP pelvis imaging will be obtained.    If you need to schedule your 2 and 6 week postoperative visits or haven't received confirmation regarding these visits, please call one of the following numbers within 7 days of discharge.    For patients that see Dr. Betancourt at:   -[facility]: [phone]  -[facility]: [phone]  -[facility]: [phone]                  Your next 10 appointments already scheduled     [place]  "31, 2018  8:00 AM CST   (Arrive by 7:45 AM)   Post-Op with  U Ortho Nurse   [facility] ([facility])    [address]  [address]th Floor  [facility] [address] [phone]              Additional Services     Home Care PT Referral for Hospital Discharge       [facility]  Phone  [phone]  Fax  [phone]    PT to eval and treat    Your provider has ordered home care - physical therapy. If you have not been contacted within 2 days of your discharge please call the department phone number listed on the top of this document.    Patient from out of state. Staying at   [address]   [address] [place] [address]  Phone: [phone]            PHYSICAL THERAPY REFERRAL       *This therapy referral will be filtered to a centralized scheduling office at [facility] and the patient will receive a call to schedule an appointment at a [facility] location most convenient for them. *     [facility] provides Physical Therapy evaluation and treatment and many specialty services across the [facility] system.  If requesting a specialty program, please choose from the list below.    If you have not heard from the scheduling office within 2 business days, please call [phone] for all locations, with the exception of [place], please call [phone].  Treatment: Evaluation & Treatment  Special Instructions/Modalities: S/p right total hip arthroplasty, progression of HEP for pt to progress toward PLOF  Special Programs: OP PT    Please be aware that coverage of these services is subject to the terms and limitations of your health insurance plan.  Call member services at your health plan with any benefit or coverage questions.      **Note to Provider:  If you are referring outside of [facility] for the therapy appointment, please list the name of the location in the \"special instructions\" above, print the referral and " "give to the patient to schedule the appointment.                  Additional Information     If you use hormonal birth control (such as the pill, patch, ring or implants): You'll need a second form of birth control for 7 days (condoms, a diaphragm or contraceptive foam). While in the hospital, you received a medicine called Bridion. Your normal birth control will not work as well for a week after taking this medicine.          Pending Results     No orders found from 1/15/2018 to 1/18/2018.            Statement of Approval     Ordered          01/19/18 1138  I have reviewed and agree with all the recommendations and orders detailed in this document.  EFFECTIVE NOW     Approved and electronically signed by:  Coco Hunter APRN CNP             Admission Information     Date & Time Provider Department Dept. Phone    1/17/2018 Sonny Betancourt MD  8A [phone]      Your Vitals Were     Blood Pressure Pulse Temperature Respirations Height Weight    114/52 (BP Location: Left arm) 52 100.1  F (37.8  C) (Oral) 16 1.727 m (5' 8\") 87.8 kg (193 lb 9 oz)    Pulse Oximetry BMI (Body Mass Index)                98% 29.43 kg/m2          MyChart Information     [facility] gives you secure access to your electronic health record. If you see a primary care provider, you can also send messages to your care team and make appointments. If you have questions, please call your primary care clinic.  If you do not have a primary care provider, please call [phone] and they will assist you.        Care EveryWhere ID     This is your Care EveryWhere ID. This could be used by other organizations to access your [facility] medical records  [id number]        Equal Access to Services     [facility] : Romi Morillo, sebastien castro, qaybmesfin kaaldary rehman. [facility] [phone].    ATENCIÓN: Si habla español, tiene a becerra disposición servicios gratuitos de asistencia " lingüísticmarryMarcelino Alva al [phone].    We comply with applicable federal civil rights laws and [place] laws. We do not discriminate on the basis of race, color, national origin, age, disability, sex, sexual orientation, or gender identity.               Review of your medicines      START taking        Dose / Directions    aspirin  MG EC tablet        Dose:  325 mg   Start taking on:  2/4/2018   Take 1 tablet (325 mg) by mouth daily for 14 days Start taking when lovenox is complete   Quantity:  14 tablet   Refills:  0       enoxaparin 40 MG/0.4ML injection   Commonly known as:  LOVENOX        Dose:  40 mg   Start taking on:  1/20/2018   Inject 0.4 mLs (40 mg) Subcutaneous every 24 hours for 14 days   Quantity:  5.6 mL   Refills:  0       oxyCODONE IR 5 MG tablet   Commonly known as:  ROXICODONE        Dose:  5-10 mg   Take 1-2 tablets (5-10 mg) by mouth every 4 hours as needed for other (pain control or improvement in physical function. Hold dose for analgesic side effects.)   Quantity:  60 tablet   Refills:  0       senna-docusate 8.6-50 MG per tablet   Commonly known as:  SENOKOT-S;PERICOLACE        Dose:  1-2 tablet   Take 1-2 tablets by mouth 2 times daily as needed for constipation   Quantity:  50 tablet   Refills:  0         CONTINUE these medicines which may have CHANGED, or have new prescriptions. If we are uncertain of the size of tablets/capsules you have at home, strength may be listed as something that might have changed.        Dose / Directions    acetaminophen 325 MG tablet   Commonly known as:  TYLENOL   This may have changed:    - medication strength  - how much to take  - when to take this  - reasons to take this  - additional instructions        Dose:  650 mg   Start taking on:  1/20/2018   Take 2 tablets (650 mg) by mouth every 4 hours as needed for pain   Quantity:  100 tablet   Refills:  0       sertraline 100 MG tablet   Commonly known as:  ZOLOFT   This may have changed:  how much to  take   Used for:  Generalized anxiety disorder        Dose:  100 mg   Take 1 tablet by mouth daily.   Quantity:  90 tablet   Refills:  3         CONTINUE these medicines which have NOT CHANGED        Dose / Directions    ATORVASTATIN CALCIUM PO        Dose:  40 mg   Take 40 mg by mouth daily   Refills:  0       CALCIUM 600 + D PO        Dose:  1 tablet   Take 1 tablet by mouth daily.   Refills:  0       clindamycin 300 MG capsule   Commonly known as:  CLEOCIN   Used for:  S/P hip replacement        Dose:  600 mg   Take 2 capsules by mouth See Admin Instructions. Take 2 capsules one hour before dental procedure   Quantity:  4 capsule   Refills:  0       metroNIDAZOLE 0.75 % cream   Commonly known as:  METROCREAM   Used for:  Acne rosacea        Apply  topically 2 times daily.   Quantity:  45 g   Refills:  4       ranitidine 150 MG tablet   Commonly known as:  ZANTAC   Used for:  History of total hip arthroplasty        Dose:  150 mg   Take 1 tablet by mouth 2 times daily.   Quantity:  180 tablet   Refills:  0       vitamin D 1000 UNITS capsule        Dose:  1 capsule   Take 1 capsule by mouth daily.   Refills:  0            Where to get your medicines      These medications were sent to [facility] [address]  [address] [address]     Phone:  [phone]     acetaminophen 325 MG tablet    aspirin  MG EC tablet    enoxaparin 40 MG/0.4ML injection    senna-docusate 8.6-50 MG per tablet         Some of these will need a paper prescription and others can be bought over the counter. Ask your nurse if you have questions.     Bring a paper prescription for each of these medications     oxyCODONE IR 5 MG tablet                Protect others around you: Learn how to safely use, store and throw away your medicines at www.disposemymeds.org.             Medication List: This is a list of all your medications and when to take them. Check marks below indicate  your daily home schedule. Keep this list as a reference.      Medications           Morning Afternoon Evening Bedtime As Needed    acetaminophen 325 MG tablet   Commonly known as:  TYLENOL   Take 2 tablets (650 mg) by mouth every 4 hours as needed for pain   Start taking on:  1/20/2018   Last time this was given:  975 mg on 1/19/2018  8:43 AM                                aspirin  MG EC tablet   Take 1 tablet (325 mg) by mouth daily for 14 days Start taking when lovenox is complete   Start taking on:  2/4/2018                                ATORVASTATIN CALCIUM PO   Take 40 mg by mouth daily   Last time this was given:  40 mg on 1/18/2018  8:24 PM                                CALCIUM 600 + D PO   Take 1 tablet by mouth daily.                                clindamycin 300 MG capsule   Commonly known as:  CLEOCIN   Take 2 capsules by mouth See Admin Instructions. Take 2 capsules one hour before dental procedure                                enoxaparin 40 MG/0.4ML injection   Commonly known as:  LOVENOX   Inject 0.4 mLs (40 mg) Subcutaneous every 24 hours for 14 days   Start taking on:  1/20/2018   Last time this was given:  40 mg on 1/19/2018  4:35 AM                                metroNIDAZOLE 0.75 % cream   Commonly known as:  METROCREAM   Apply  topically 2 times daily.                                oxyCODONE IR 5 MG tablet   Commonly known as:  ROXICODONE   Take 1-2 tablets (5-10 mg) by mouth every 4 hours as needed for other (pain control or improvement in physical function. Hold dose for analgesic side effects.)   Last time this was given:  5 mg on 1/19/2018  1:09 PM                                ranitidine 150 MG tablet   Commonly known as:  ZANTAC   Take 1 tablet by mouth 2 times daily.   Last time this was given:  150 mg on 1/19/2018  8:44 AM                                senna-docusate 8.6-50 MG per tablet   Commonly known as:  SENOKOT-S;PERICOLACE   Take 1-2 tablets by mouth 2 times daily as  needed for constipation   Last time this was given:  2 tablets on 1/19/2018  8:43 AM                                sertraline 100 MG tablet   Commonly known as:  ZOLOFT   Take 1 tablet by mouth daily.   Last time this was given:  50 mg on 1/19/2018  8:44 AM                                vitamin D 1000 UNITS capsule   Take 1 capsule by mouth daily.

## 2018-01-17 NOTE — ANESTHESIA PREPROCEDURE EVALUATION
Anesthesia Evaluation     . Pt has had prior anesthetic. Type of anesthetic: woke up at the end with paralytic still working per patient. No complications from this.    No history of anesthetic complications          ROS/MED HX    ENT/Pulmonary:  - neg pulmonary ROS    (-) tobacco use   Neurologic:  - neg neurologic ROS     Cardiovascular:  - neg cardiovascular ROS   (+) ----. : . . . :. . No previous cardiac testing       METS/Exercise Tolerance:  >4 METS   Hematologic:  - neg hematologic  ROS       Musculoskeletal:   (+) , , other musculoskeletal- Hip pain      GI/Hepatic:  - neg GI/hepatic ROS       Renal/Genitourinary:  - ROS Renal section negative       Endo:  - neg endo ROS       Psychiatric:     (+) psychiatric history depression and anxiety      Infectious Disease:  - neg infectious disease ROS       Malignancy:      - no malignancy   Other:    - neg other ROS                 Physical Exam  Normal systems: cardiovascular, pulmonary and dental    Airway   Mallampati: I  TM distance: >3 FB  Neck ROM: full    Dental     Cardiovascular   Rhythm and rate: regular and normal      Pulmonary    breath sounds clear to auscultation                    Anesthesia Plan      History & Physical Review  History and physical reviewed and following examination; no interval change.    ASA Status:  2 .    NPO Status:  > 8 hours    Plan for General and ETT with Intravenous and Propofol induction. Maintenance will be Balanced.    PONV prophylaxis:  Ondansetron (or other 5HT-3) and Dexamethasone or Solumedrol       Postoperative Care  Postoperative pain management:  Multi-modal analgesia and IV analgesics.      Consents  Anesthetic plan, risks, benefits and alternatives discussed with:  Patient.  Use of blood products discussed: Yes.   Use of blood products discussed with Patient.  Consented to blood products.  .                          .

## 2018-01-17 NOTE — PROGRESS NOTES
SPIRITUAL HEALTH SERVICES  SPIRITUAL ASSESSMENT Progress Note  [facility] ([facility]) 8A   ON-CALL VISIT    REFERRAL SOURCE: Patient had requested a  visit and prayer before surgery.      was unable to see patient pre-surgery.  I did stop by her bedside and apologize for this oversight. Patient states that she has a lot of  people praying for her and she felt that the surgery this morning  went very well. Patient is Mu-ism by kike tradition.  She did not have any concerns today.     PLAN: I will notify unit  of care provided and that patient is open to future visits from  as able.    Dominga Bassett  Staff   Pager [phone]

## 2018-01-17 NOTE — ANESTHESIA POSTPROCEDURE EVALUATION
Patient: Lorie Krishnamurthy    Procedure(s):  Right Total Hip Arthroplasty  - Wound Class: I-Clean    Diagnosis:Osteoarthritis  Diagnosis Additional Information: No value filed.    Anesthesia Type:  General, ETT    Note:  Anesthesia Post Evaluation    Patient location during evaluation: Phase 2  Patient participation: Able to fully participate in evaluation  Level of consciousness: awake and alert  Pain management: adequate  Airway patency: patent  Cardiovascular status: acceptable  Respiratory status: acceptable  Hydration status: acceptable  PONV: none     Anesthetic complications: None          Last vitals:  Vitals:    01/17/18 0948 01/17/18 1000 01/17/18 1015   BP: 114/67 106/75 110/68   Resp: 16 12 8   Temp: 36.6  C (97.9  F)  36.6  C (97.9  F)   SpO2: 99% 100% 96%         Electronically Signed By: Sean Phan DO  January 17, 2018  10:19 AM

## 2018-01-17 NOTE — LETTER
Transition Communication Hand-off for Care Transitions to Next Level of Care Provider    Name: Lorie Krishnamurthy  MRN #: [id number]  Primary Care Provider: Physician No Ref-Primary     Primary Clinic: No address on file     Reason for Hospitalization:  Osteoarthritis  Status post hip surgery  Admit Date/Time: 1/17/2018  5:17 AM  Discharge Date: 1/19/2018  Payor Source: Payor: BCBS / Plan: [place]BS [place] / Product Type: Indemnity /          Reason for Communication Hand-off Referral: Avoidable readmission within 30 days    Discharge Needs Assessment:  Needs       Most Recent Value    Transportation Available car, family or friend will provide        Follow-up specialty is recommended: Yes    Follow-up plan:  Future Appointments  Date Time Provider Department Center   1/31/2018 8:00 AM Nurse, [facility]       Any outstanding tests or procedures:        Referrals     Future Labs/Procedures    Home Care PT Referral for Hospital Discharge     Comments:    [facility]  Phone  [phone]  Fax  [phone]    PT to eval and treat    Your provider has ordered home care - physical therapy. If you have not been contacted within 2 days of your discharge please call the department phone number listed on the top of this document.    Patient from out of state. Staying at   [address]   [address] [place] [address]  Phone: [phone]    PHYSICAL THERAPY REFERRAL     Comments:    *This therapy referral will be filtered to a centralized scheduling office at [facility] and the patient will receive a call to schedule an appointment at a [facility] location most convenient for them. *     [facility] provides Physical Therapy evaluation and treatment and many specialty services across the [facility] system.  If requesting a specialty program, please choose from the list below.    If you have not heard from the scheduling office within 2 business days, please call  "[phone] for all locations, with the exception of [facility], please call [phone].  Treatment: Evaluation & Treatment  Special Instructions/Modalities: S/p right total hip arthroplasty, progression of HEP for pt to progress toward PLOF  Special Programs: OP PT    Please be aware that coverage of these services is subject to the terms and limitations of your health insurance plan.  Call member services at your health plan with any benefit or coverage questions.      **Note to Provider:  If you are referring outside of [facility] for the therapy appointment, please list the name of the location in the \"special instructions\" above, print the referral and give to the patient to schedule the appointment.          Katey Cosme RN, BSN  Care Coordinator, 8A  Phone [phone]  Pager [phone]    AVS/Discharge Summary is the source of truth; this is a helpful guide for improved communication of patient story          "

## 2018-01-17 NOTE — OP NOTE
OPERATIVE REPORT    DATE OF SERVICE:     SURGEON: Sonny Betancourt MD.    ASSISTANT(S):  Barrett Najera MD and Tres Bergman MD G4    PREOPERATIVE DIAGNOSIS:  Osteoarthritis    POSTOPERATIVE DIAGNOSIS:  Osteoarthritis    OPERATION PERFORMED:  Right total hip arthroplasty    IMPLANTS:  [employer]    ANESTHETIC: General     OPERATIVE FINDINGS:  End stage arthrosis of the hip    BLOOD LOSS: 200 cc    COMPLICATIONS:  None apparent    OPERATIVE INDICATIONS:  The patient has a long history of debilitating pain secondary to ostearthritis of the hip.  Despite comprehensive non-operative management these symptoms continued to interfere with activities of daily living.  After discussion of further treatment options including the risks and benefits that patient elected to proceed with a total hip.    DESCRIPTION OF THE PROCEDURE:  The patient was identified in the preoperative holding area.  The consent form including the risks and benefits were reviewed with the patient.  The operative limb was identified and marked.  The patient was brought back to the operating room and placed supine on the operating table.  An anesthetic was induced by the anesthesia team.   The patient was placed in the lateral decubitus position and prepped and draped in the normal standard fashion for a hip replacement.  A time-out was called.  Antibiotics were given.  We utilized an approximately 15 cm curvilinear incision, centered on the vastus ridge, and performed a standard posterior approach to the hip.  The tensor fascia was split.  A small portion of gluteus juany was split in line with its fibers.  The sciatic nerve was palpated.  The east-west retractor was placed.  The posterior border of gluteus medius was exposed and retracted.  The tendon of piriformis and that of the obturators was released from their attachments.  A trapdoor posterior capsulotomy was performed.  The hip was dislocated.  The lesser trochanter was exposed.  A ruler  was used to measure and electrocautery was used to tahir our neck cut as preoperatively templated.  The head was measured with a caliper and found to be 47 mm.  This measurement was used to choose our first reamer.  The neck cut was re-measured. The femur was elevated.  A Hohmann was placed over the anterior rim of the acetabulum and the femur was subluxed anterior.  A split was made in the inferior capsule.  The transverse acetabular ligament was left intact and used a guide for the anterversion of the acetabular component.  Circumferential retractors were placed.  We began reaming and went up by two until sufficient contact was made with the acetabular rim.  We then went up by one millimeter for a one millimeter press-fit.  We were within one size of our preoperative plan.   A trail was placed.  It had an excellent press fit.  We then placed out final component in 40 degrees of inclination and approximately 20 degrees of anteversion, parallel to the transverse ligament.  The press fit was excellent.  Screws were placed for additional initial fixation.  A flat liner was then placed. It locked into place.  Attention was turned to the femur.  Retractors were placed to elevated the proximal femur and to protect the tendon of gluteus medius.  Remaining lateral neck was removed and the piriformis fossa was cleared of soft-tissue.  A box osteotome and canal finder were used to prepare for broaching.  A sharp broach was used to lateralize slightly.  We then broached up sequentially to a size 6.  It was rotationally stable and sat up 1-2 millimeters from the neck-cut.  We trialed with standard and high offsets.  Preoperatively the patient had templated to a high offset stem.  The high offset stem most appropriately tensioned the abductors.  We trialed with  +0 head and it appropriately tensioned the abductors and clinically equalized the leg-lengths.  The stability exam was excellent.  The hip was stable and there was no  "impingement posteriorly with hyper-extension and maximal external rotation.  With full extension, the knee could be fixed to bring the foot nearly to the buttock.  With the hip in ninety degrees of flexion and neutral rotation there was greater than 60 degrees of internal rotation before subluxation.  There appropriate movement with a \"bk\" test.  Happy with our stability exam, the final implant was placed in approximately twenty degrees of anteversion.  It sat at the level of the broach.  We then trailed with a 0 head.  The stability exam was identical.  We then placed the final head on a clean, dry neck and impacted it into place. The hip was reduced after directly visualizing the entire acetabulum.  The wound was then irrigated.  The posterior capsule and short external rotators were sutured to the greater trochanter with non-absorbable suture through bone tunnels.The fascia was closed with interrupted Vicryl, the dermis with interrupted Vicryl, and skin with running monocryl, Dermabond and steri-strips.  At the end of the procedure the sponge and needle counts were correct times two.  The patient tolerated the procedure well and returned to the PAR extubated and stable.    POSTOPERATIVE PLAN:  1. Weight bearing as tolerated  2. Standard posterior hip precautions  3. DVT prophylaxis   4. 24 hours of prophylactic antibiotics  5. Follow-up:  Wound clinic in 2 weeks and with Serafin in clinic in 6 weeks for x-rays and a rehabilitation check.    "

## 2018-01-17 NOTE — PROGRESS NOTES
01/17/18 1600   Quick Adds   Type of Visit Initial PT Evaluation   Living Environment   Lives With significant other   Living Arrangements condominium   Home Accessibility bed and bath on same level;grab bars present (bathtub)   Number of Stairs to Enter Home 0   Number of Stairs Within Home 0   Transportation Available family or friend will provide   Living Environment Comment Pt lives with partner in [place], no stairs to enter or in home. Pt will be staying in hotel and w/ a friend for two weeks after d/c before flying back; friend's place has 5-7 stairs to enter.   Self-Care   Dominant Hand right   Usual Activity Tolerance good   Current Activity Tolerance moderate   Regular Exercise yes   Activity/Exercise Type other (see comments)  (pilates, hiking)   Activity/Exercise/Self-Care Comment Pt has a cane, walker, and leg  at home.   Functional Level Prior   Ambulation 0-->independent   Transferring 0-->independent   Toileting 0-->independent   Bathing 0-->independent   Dressing 0-->independent   Eating 0-->independent   Communication 0-->understands/communicates without difficulty   Swallowing 0-->swallows foods/liquids without difficulty   Cognition 0 - no cognition issues reported   Fall history within last six months yes   Number of times patient has fallen within last six months 1   Which of the above functional risks had a recent onset or change? fall history;ambulation;transferring   Prior Functional Level Comment Pt previously independent with all ADL's.    General Information   Onset of Illness/Injury or Date of Surgery - Date 01/17/18   Referring Physician Sonny Betancourt MD   Patient/Family Goals Statement Return to lesuire activities (hiking, teaching pilates), be able to do stairs   Pertinent History of Current Problem (include personal factors and/or comorbidities that impact the POC) s/p R VALARIE performed on 1/17/18   Precautions/Limitations fall precautions;right hip precautions    Weight-Bearing Status - LUE full weight-bearing   Weight-Bearing Status - RUE full weight-bearing   Weight-Bearing Status - LLE full weight-bearing   Weight-Bearing Status - RLE weight-bearing as tolerated   General Observations Pt in bed at start of session, very excited to begin PT.   General Info Comments IV, capno, O2   Cognitive Status Examination   Orientation orientation to person, place and time   Level of Consciousness alert   Follows Commands and Answers Questions 100% of the time   Personal Safety and Judgment intact   Memory intact   Pain Assessment   Patient Currently in Pain No   Posture    Posture Not impaired   Range of Motion (ROM)   ROM Comment RLE ROM impaired d/t surgery, all other WFL   Strength   Strength Comments RLE strength impaired d/t surgery, demonstrates functional strength with mobility   Bed Mobility   Bed Mobility Comments needs min A to get RLE in/out bed   Transfer Skills   Transfer Comments sit<>stand using walker, SBA   Gait   Gait Comments Pt amb 550 ft w/ fww, SBA. Demo step through gait pattern, steady gait, equal step length (B)   Balance   Balance Comments able to sit at EOB w/o UE support, able to stand w/o UE for 1 min   General Therapy Interventions   Planned Therapy Interventions strengthening;ROM;gait training   Clinical Impression   Criteria for Skilled Therapeutic Intervention yes, treatment indicated   PT Diagnosis impaired functional mobility   Influenced by the following impairments ROM, strength   Functional limitations due to impairments unable to access leisure activities, needs assist w/ transfers and ambulation   Clinical Presentation Stable/Uncomplicated   Clinical Presentation Rationale s/p R VALARIE, medically stable    Clinical Decision Making (Complexity) Low complexity   Therapy Frequency` 2 times/day   Predicted Duration of Therapy Intervention (days/wks) 3   Anticipated Equipment Needs at Discharge (pt has walker at home)   Anticipated Discharge  "Disposition Home with Outpatient Therapy   Risk & Benefits of therapy have been explained Yes   Patient, Family & other staff in agreement with plan of care Yes   [facility] AM-PAC  \"6 Clicks\" V.2 Basic Mobility Inpatient Short Form   1. Turning from your back to your side while in a flat bed without using bedrails? 3 - A Little   2. Moving from lying on your back to sitting on the side of a flat bed without using bedrails? 3 - A Little   3. Moving to and from a bed to a chair (including a wheelchair)? 3 - A Little   4. Standing up from a chair using your arms (e.g., wheelchair, or bedside chair)? 4 - None   5. To walk in hospital room? 3 - A Little   6. Climbing 3-5 steps with a railing? 3 - A Little   Basic Mobility Raw Score (Score out of 24.Lower scores equate to lower levels of function) 19   Total Evaluation Time   Total Evaluation Time (Minutes) 8     "

## 2018-01-17 NOTE — PLAN OF CARE
Problem: Hip Arthroplasty (Total, Partial) (Adult)  Goal: Signs and Symptoms of Listed Potential Problems Will be Absent, Minimized or Managed (Hip Arthroplasty)  Signs and symptoms of listed potential problems will be absent, minimized or managed by discharge/transition of care (reference Hip Arthroplasty (Total, Partial) (Adult) CPG).   Outcome: Improving  Pt arrived to unit at 1130 and was oriented to room and call light  VS: VS and capnography stable   O2: Currently on 1.5L O2 to keep sats above 90%   Output: Voiding adequate amounts.   Last BM: 1/17   Activity: WBAT; 1A with FWW and gait belt. Ambulated in halls with PT   Skin: Intact except for incision   Pain: Managed with 5mg oxycodone   CMS: Intact   Dressing: Aquacel CDI   Diet: Advanced diet to regular which pt tolerated well   LDA: PIV infusing   Equipment: Teds, PCDs, capnography, abduction pillow, IV pole, gait belt, FWW   Plan: Likely home upon DC   Additional Info: Pt received general anesthesia + cocktail. .

## 2018-01-17 NOTE — OR NURSING
PACU to Inpatient Nursing Handoff    Patient Lorie Krishnamurthy is a 57 year old female who speaks English.   Procedure Procedure(s):  Right Total Hip Arthroplasty  - Wound Class: I-Clean   Surgeon(s) Primary: Sonny Betancourt MD  Assisting: Gurinder Najera MD  Resident - Assisting: Tres Bergman MD     Allergies   Allergen Reactions     Darvocet [Propoxyphene N-Apap] Hives     Penicillins Hives     Sulfa Drugs Rash     Codeine Sulfate Nausea and Vomiting     Vicodin [Hydrocodone-Acetaminophen] Nausea and Vomiting       Isolation  [unfilled]    Past Medical History   has a past medical history of Anesthesia complication; Depression; and Depression, anxiety. She also has no past medical history of Complication of anesthesia.  L hip VALARIE in 2012, Back surgery    Anesthesia General   Dermatome Level     Preop Meds acetaminophen (Tylenol) - time given: 0700  celecoxib (Celebrex) - time given: 0624  gabapentin (Neurontin) - time given: 0624   Nerve block Not applicable   Intraop Meds dexamethasone (Decadron)  fentanyl (Sublimaze): 250 mcg total  hydromorphone (Dilaudid): 1 mg total  ondansetron (Zofran): last given at 0858   Local Meds Yes - Local Cocktail (morphine, ropivacaine, epinephrine, Toradol)   Antibiotics clindamycin (Cleocin) - last given at 0741     Pain Patient Currently in Pain: denies  Comfort: negligible pain   PACU meds  Not applicable   PCA / epidural No   Capnography  yes   Telemetry ECG Rhythm: Sinus rhythm      Labs Glucose Lab Results   Component Value Date    GLC 97 12/31/2012       Hgb Lab Results   Component Value Date    HGB 10.1 03/22/2012       INR Lab Results   Component Value Date    INR 2.36 04/11/2012      PACU Imaging Completed     Wound/Incision Incision/Surgical Site 03/19/12 Left;Lateral Hip (Active)   Number of days:2130       Incision/Surgical Site 01/17/18 Right Hip (Active)   Incision Assessment WDL 1/17/2018 10:15 AM   Closure Sutures 1/17/2018  8:10 AM   Dressing Intervention  Clean, dry, intact 1/17/2018 10:15 AM   Number of days:0      CMS Peripheral Neurovascular WDL: WDL (01/17/18 0640)      Equipment ice pack and abductor pillow   Other LDA       IV Access Peripheral IV 03/19/12 Right Hand (Active)   Number of days:2130       Peripheral IV 01/17/18 Right Hand (Active)   Site Assessment WDL 1/17/2018 10:14 AM   Line Status Infusing 1/17/2018 10:14 AM   Phlebitis Scale 0-->no symptoms 1/17/2018 10:14 AM   Infiltration Scale 0 1/17/2018 10:14 AM   Number of days:0      Blood Products Not applicable  mL   Intake/Output Date 01/17/18 0700 - 01/18/18 0659   Shift 2494-5122 8212-9985 1831-9515 24 Hour Total   I  N  T  A  K  E   I.V. 1200   1200    Shift Total  (mL/kg) 1200  (13.67)   1200  (13.67)   O  U  T  P  U  T   Blood 200   200    Shift Total  (mL/kg) 200  (2.28)   200  (2.28)   Weight (kg) 87.8 87.8 87.8 87.8        Drains / Panda     Time of void PreOp Void Prior to Procedure: 0543 (01/17/18 0640)    PostOp     Bladder Scan  200 @ 1100   PO    tolerating sips     Vitals    B/P: 110/68  T: 97.9  F (36.6  C)    Temp src: Axillary  P:       Heart Rate: 71 (01/17/18 1015)     R: 8  O2:  SpO2: 96 %    O2 Device: Simple face mask (01/17/18 1015)    Oxygen Delivery: 8 LPM (01/17/18 1015)         Family/support present yes   Patient belongings     Patient transported on bed   DC meds/scripts (obs/outpt) Not applicable     Special needs/considerations None   Tasks needing completion None       Israel Manzo, TIMOTEO  ASCOM [id number]

## 2018-01-17 NOTE — ANESTHESIA CARE TRANSFER NOTE
Patient: Lorie Krishnamurthy    Procedure(s):  Right Total Hip Arthroplasty  - Wound Class: I-Clean    Diagnosis: Osteoarthritis  Diagnosis Additional Information: No value filed.    Anesthesia Type:   General, ETT     Note:  Airway :Face Mask  Patient transferred to:PACU  Comments: Spont respirations, oral suction done. Extubuted to FMO2, VS remain stable without s/s resp distress. Transported to PACU, report to RN. Handoff Report: Identifed the Patient, Identified the Reponsible Provider, Reviewed the pertinent medical history, Discussed the surgical course, Reviewed Intra-OP anesthesia mangement and issues during anesthesia, Set expectations for post-procedure period and Allowed opportunity for questions and acknowledgement of understanding      Vitals: (Last set prior to Anesthesia Care Transfer)    CRNA VITALS  1/17/2018 0915 - 1/17/2018 0954      1/17/2018             Pulse: 88    SpO2: 99 %    Resp Rate (observed): (!)  7                Electronically Signed By: AYSHA Sykes CRNA  January 17, 2018  9:54 AM

## 2018-01-18 ENCOUNTER — APPOINTMENT (OUTPATIENT)
Dept: PHYSICAL THERAPY | Facility: CLINIC | Age: 58
End: 2018-01-18
Attending: ORTHOPAEDIC SURGERY
Payer: COMMERCIAL

## 2018-01-18 ENCOUNTER — APPOINTMENT (OUTPATIENT)
Dept: OCCUPATIONAL THERAPY | Facility: CLINIC | Age: 58
End: 2018-01-18
Attending: ORTHOPAEDIC SURGERY
Payer: COMMERCIAL

## 2018-01-18 LAB
CREAT SERPL-MCNC: 0.58 MG/DL (ref 0.52–1.04)
GFR SERPL CREATININE-BSD FRML MDRD: >90 ML/MIN/1.7M2
HGB BLD-MCNC: 11.3 G/DL (ref 11.7–15.7)

## 2018-01-18 PROCEDURE — 97116 GAIT TRAINING THERAPY: CPT | Mod: GP | Performed by: PHYSICAL THERAPIST

## 2018-01-18 PROCEDURE — 25000132 ZZH RX MED GY IP 250 OP 250 PS 637: Performed by: INTERNAL MEDICINE

## 2018-01-18 PROCEDURE — 25000125 ZZHC RX 250: Performed by: ORTHOPAEDIC SURGERY

## 2018-01-18 PROCEDURE — 97165 OT EVAL LOW COMPLEX 30 MIN: CPT | Mod: GO | Performed by: OCCUPATIONAL THERAPIST

## 2018-01-18 PROCEDURE — 97535 SELF CARE MNGMENT TRAINING: CPT | Mod: GO | Performed by: OCCUPATIONAL THERAPIST

## 2018-01-18 PROCEDURE — 85018 HEMOGLOBIN: CPT | Performed by: ORTHOPAEDIC SURGERY

## 2018-01-18 PROCEDURE — 97110 THERAPEUTIC EXERCISES: CPT | Mod: GP | Performed by: PHYSICAL THERAPIST

## 2018-01-18 PROCEDURE — 40000133 ZZH STATISTIC OT WARD VISIT: Performed by: OCCUPATIONAL THERAPIST

## 2018-01-18 PROCEDURE — 40000193 ZZH STATISTIC PT WARD VISIT: Performed by: PHYSICAL THERAPIST

## 2018-01-18 PROCEDURE — 82565 ASSAY OF CREATININE: CPT | Performed by: ORTHOPAEDIC SURGERY

## 2018-01-18 PROCEDURE — 36415 COLL VENOUS BLD VENIPUNCTURE: CPT | Performed by: ORTHOPAEDIC SURGERY

## 2018-01-18 PROCEDURE — 99207 ZZC CDG-DOWN CODE MED NECESSITY: CPT | Performed by: INTERNAL MEDICINE

## 2018-01-18 PROCEDURE — 25000128 H RX IP 250 OP 636: Performed by: ORTHOPAEDIC SURGERY

## 2018-01-18 PROCEDURE — 25000132 ZZH RX MED GY IP 250 OP 250 PS 637: Performed by: ORTHOPAEDIC SURGERY

## 2018-01-18 PROCEDURE — 12000001 ZZH R&B MED SURG/OB UMMC

## 2018-01-18 PROCEDURE — 97530 THERAPEUTIC ACTIVITIES: CPT | Mod: GO | Performed by: OCCUPATIONAL THERAPIST

## 2018-01-18 RX ORDER — OXYCODONE HYDROCHLORIDE 5 MG/1
5-10 TABLET ORAL
Qty: 60 TABLET | Refills: 0 | Status: SHIPPED | OUTPATIENT
Start: 2018-01-18 | End: 2018-01-18

## 2018-01-18 RX ORDER — ACETAMINOPHEN 325 MG/1
650 TABLET ORAL EVERY 4 HOURS PRN
Qty: 100 TABLET | Refills: 0 | Status: SHIPPED | OUTPATIENT
Start: 2018-01-20

## 2018-01-18 RX ORDER — CALCIUM CARBONATE 500 MG/1
500 TABLET, CHEWABLE ORAL 3 TIMES DAILY PRN
Status: DISCONTINUED | OUTPATIENT
Start: 2018-01-18 | End: 2018-01-19 | Stop reason: HOSPADM

## 2018-01-18 RX ORDER — AMOXICILLIN 250 MG
1-2 CAPSULE ORAL 2 TIMES DAILY PRN
Qty: 50 TABLET | Refills: 0 | Status: SHIPPED | OUTPATIENT
Start: 2018-01-18 | End: 2018-02-22

## 2018-01-18 RX ORDER — OXYCODONE HYDROCHLORIDE 5 MG/1
5-10 TABLET ORAL EVERY 4 HOURS PRN
Qty: 60 TABLET | Refills: 0 | Status: SHIPPED | OUTPATIENT
Start: 2018-01-18 | End: 2018-02-22

## 2018-01-18 RX ORDER — HYDROXYZINE HYDROCHLORIDE 25 MG/1
25 TABLET, FILM COATED ORAL 3 TIMES DAILY PRN
Status: DISCONTINUED | OUTPATIENT
Start: 2018-01-18 | End: 2018-01-19 | Stop reason: HOSPADM

## 2018-01-18 RX ORDER — AMOXICILLIN 250 MG
1-2 CAPSULE ORAL 2 TIMES DAILY PRN
Qty: 100 TABLET | Refills: 0 | Status: SHIPPED | OUTPATIENT
Start: 2018-01-18 | End: 2018-01-18

## 2018-01-18 RX ORDER — ACETAMINOPHEN 325 MG/1
650 TABLET ORAL EVERY 4 HOURS PRN
Qty: 100 TABLET | Refills: 0 | Status: SHIPPED | OUTPATIENT
Start: 2018-01-20 | End: 2018-01-18

## 2018-01-18 RX ADMIN — OXYCODONE HYDROCHLORIDE 5 MG: 5 TABLET ORAL at 20:24

## 2018-01-18 RX ADMIN — SENNOSIDES AND DOCUSATE SODIUM 2 TABLET: 8.6; 5 TABLET ORAL at 20:24

## 2018-01-18 RX ADMIN — OXYCODONE HYDROCHLORIDE 5 MG: 5 TABLET ORAL at 08:41

## 2018-01-18 RX ADMIN — CALCIUM CARBONATE (ANTACID) CHEW TAB 500 MG 500 MG: 500 CHEW TAB at 18:38

## 2018-01-18 RX ADMIN — OXYCODONE HYDROCHLORIDE 5 MG: 5 TABLET ORAL at 16:23

## 2018-01-18 RX ADMIN — CLINDAMYCIN PHOSPHATE 900 MG: 18 INJECTION, SOLUTION INTRAVENOUS at 00:16

## 2018-01-18 RX ADMIN — HYDROXYZINE HYDROCHLORIDE 25 MG: 25 TABLET ORAL at 16:56

## 2018-01-18 RX ADMIN — RANITIDINE 150 MG: 150 TABLET ORAL at 08:41

## 2018-01-18 RX ADMIN — OXYCODONE HYDROCHLORIDE 5 MG: 5 TABLET ORAL at 11:58

## 2018-01-18 RX ADMIN — ENOXAPARIN SODIUM 40 MG: 40 INJECTION SUBCUTANEOUS at 04:30

## 2018-01-18 RX ADMIN — OXYCODONE HYDROCHLORIDE 5 MG: 5 TABLET ORAL at 00:46

## 2018-01-18 RX ADMIN — SODIUM CHLORIDE, POTASSIUM CHLORIDE, SODIUM LACTATE AND CALCIUM CHLORIDE: 600; 310; 30; 20 INJECTION, SOLUTION INTRAVENOUS at 00:46

## 2018-01-18 RX ADMIN — SERTRALINE HYDROCHLORIDE 50 MG: 50 TABLET ORAL at 08:41

## 2018-01-18 RX ADMIN — SENNOSIDES AND DOCUSATE SODIUM 2 TABLET: 8.6; 5 TABLET ORAL at 08:41

## 2018-01-18 RX ADMIN — RANITIDINE 150 MG: 150 TABLET ORAL at 20:24

## 2018-01-18 RX ADMIN — ACETAMINOPHEN 975 MG: 325 TABLET, FILM COATED ORAL at 15:08

## 2018-01-18 RX ADMIN — OXYCODONE HYDROCHLORIDE 5 MG: 5 TABLET ORAL at 15:08

## 2018-01-18 RX ADMIN — ATORVASTATIN CALCIUM 40 MG: 20 TABLET, FILM COATED ORAL at 20:24

## 2018-01-18 RX ADMIN — OXYCODONE HYDROCHLORIDE 5 MG: 5 TABLET ORAL at 04:30

## 2018-01-18 RX ADMIN — ACETAMINOPHEN 975 MG: 325 TABLET, FILM COATED ORAL at 08:42

## 2018-01-18 RX ADMIN — ACETAMINOPHEN 975 MG: 325 TABLET, FILM COATED ORAL at 00:15

## 2018-01-18 NOTE — PROGRESS NOTES
01/18/18 1056   Quick Adds   Type of Visit Initial Occupational Therapy Evaluation   Living Environment   Lives With significant other   Living Arrangements hotel/motel   Home Accessibility no concerns   Number of Stairs to Enter Home 0   Number of Stairs Within Home 0   Transportation Available car;family or friend will provide   Living Environment Comment Pt lives with partner in [place], no stairs to enter or in home. Pt will be staying in hotel and w/ a friend for two weeks after d/c before flying back; friend's place has 5-7 stairs to enter.   Self-Care   Usual Activity Tolerance good   Current Activity Tolerance moderate   Regular Exercise yes   Activity/Exercise/Self-Care Comment Patient independent in self-cares/mobility without AE.   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 Betancourt   Patient/Family Goals Statement return home to baseline   Additional Occupational Profile Info/Pertinent History of Current Problem POD #1 R VALARIE; L VALARIE 2012   Precautions/Limitations right hip precautions   Weight-Bearing Status - RLE weight-bearing as tolerated   General Observations On RA, alert.   Cognitive Status Examination   Cognitive Comment No cognitive concerns   Pain Assessment   Patient Currently in Pain Yes, see Vital Sign flowsheet   Mobility   Bed Mobility Bed mobility skill: Sit to supine;Bed mobility skill: Supine to sit   Bed Mobility Skill: Sit to Supine   Level of [place]: Sit/Supine  stand-by assist   Assistive Device: Sit/Supine other (see comments)  (leg )   Bed Mobility Skill: Supine to Sit   Level of [place]: Supine/Sit stand-by assist   Transfer Skill: Bed to Chair/Chair to Bed   Level of [place]: Bed to Chair stand-by assist   Assistive Device - Transfer Skill Bed to Chair Chair to Bed Rehab Eval standard walker   Transfer Skill: Sit to Stand   Level of [place]: Sit/Stand stand-by assist   Assistive Device for Transfer: Sit/Stand standard walker   Transfer Skill: Toilet Transfer   Level of [place]: Toilet stand-by assist   Assistive Device standard walker   Tub/Shower Transfer   Tub/Shower Transfer Transfer Skill: Tub/Shower Transfers   Transfer Skill: Tub/Shower Transfer   Level of [place]: Tub/Shower stand-by assist   Upper Body Dressing   Level of [place]: Dress Upper Body independent   Lower Body Dressing   Level of [place]: Dress Lower Body stand-by assist   Assistive Device reacher;sock-aid   Toileting   Level of [place]: Toilet stand-by assist   Grooming   Level of [place]: Grooming independent   Eating/Self Feeding   Level of [place]: Eating independent   Activities of Daily Living Analysis   Impairments Contributing to Impaired Activities of Daily Living pain;post surgical precautions;ROM decreased   General Therapy Interventions   Planned Therapy Interventions ADL retraining   Clinical Impression   Criteria for Skilled Therapeutic Interventions Met yes, treatment indicated   OT Diagnosis impaired self-cares/mobility   Influenced by the following impairments pain; decreased ROM   Assessment of Occupational Performance 1-3 Performance Deficits   Identified Performance Deficits dressing, bathing, toileting   Clinical Decision Making (Complexity) Low complexity   Therapy Frequency daily   Predicted Duration of Therapy Intervention (days/wks) 1 day   Anticipated Discharge Disposition Home   Risks and Benefits of Treatment have  "been explained. Yes   Patient, Family & other staff in agreement with plan of care Yes   [facility] TM \"6 Clicks\"   2016, Trustees of [facility], under license to [employer].  All rights reserved.   6 Clicks Short Forms Daily Activity Inpatient Short Form   [facility] AM-PAC  \"6 Clicks\" Daily Activity Inpatient Short Form   1. Putting on and taking off regular lower body clothing? 4 - None   2. Bathing (including washing, rinsing, drying)? 4 - None   3. Toileting, which includes using toilet, bedpan or urinal? 4 - None   4. Putting on and taking off regular upper body clothing? 4 - None   5. Taking care of personal grooming such as brushing teeth? 4 - None   6. Eating meals? 4 - None   Daily Activity Raw Score (Score out of 24.Lower scores equate to lower levels of function) 24   Total Evaluation Time   Total Evaluation Time (Minutes) 8     "

## 2018-01-18 NOTE — PLAN OF CARE
Problem: Hip Arthroplasty (Total, Partial) (Adult)  Goal: Signs and Symptoms of Listed Potential Problems Will be Absent, Minimized or Managed (Hip Arthroplasty)  Signs and symptoms of listed potential problems will be absent, minimized or managed by discharge/transition of care (reference Hip Arthroplasty (Total, Partial) (Adult) CPG).   Outcome: Improving  Patient is AxO. VSS with soft BP. Capno is in place and maintaining sats of room air. Denies any chest pain, SOB, numbness or tingling. PIV is patent and infusing. Tolerating regular diet. Voiding without difficulties. Reports passing gas. Dressing to the right hip is CDI. PRN oxycodone given for pain. Patient is able to use the call light to make needs known and call light remained within reach for the duration of the shift. Continue POC.

## 2018-01-18 NOTE — PLAN OF CARE
Problem: Patient Care Overview  Goal: Plan of Care/Patient Progress Review  Discharge Planner PT   Patient plan for discharge: Home with assist, OP PT  Current status: Pt demonstrates bed mobility with SBA, transfers and gait with SBA using FWW. Pt is aware of hip precautions and is safe with all mobility. Navigates stairs with CGA progressing to SBA today using 1 cane and handrail, instructed pt in friend assisting with safety on stairs. Pt is IND in a HEP. Requesting OP PT for these next few weeks while she stays in [place] to recover, states her friend will be able to drive her to appointments.  Barriers to return to prior living situation: None  Recommendations for discharge: Home with assist, OP PT  Rationale for recommendations: OP PT for progression of exercises as pt is very active and could benefit from progression of supine HEP to seated or standing       Entered by: Mildred Boyle 01/18/2018 2:40 PM     Physical Therapy Discharge Summary    Reason for therapy discharge:    Discharged to home with outpatient therapy.    Progress towards therapy goal(s). See goals on Care Plan in [facility] electronic health record for goal details.  Goals met    Therapy recommendation(s):    Continued therapy is recommended.  Rationale/Recommendations:  OP PT for progression of HEP and continued strengthening/ROM.

## 2018-01-18 NOTE — PROGRESS NOTES
"[facility], [place]   Internal Medicine Daily Note          Assessment and Plan:     Assessment:   57 yr old female patient with no past cardi/respiratory history, who is S/P Rt hip arthroplasty     Plan:  S/P Rt Hip Arthroplasty, POD 1:  Management by primary team. Please see their notes for further details  Stop IV fluids   Pain control with acetaminophen 975 mg every 8 hrs for 3 days, Oxycodone 5-10 mg every 3 hrs PRN and IV hydromorphone 0.3-0.5 mg q 3 hrs for breakthrough pain   DVT prophylaxis with  SCDs while in hospital, and Lovenox  for 4 weeks  PT/OT as per protocol   Incentive spirometry and aggressive bowel regimen  We will monitor for acute blood loss anemia. Pre op Hgb at 15.3. Post op 11.3     Resume home medications ( atorvastatin, ranitidine and Zoloft)      Consulting teams: Internal medicine   Code status: Full   DVT Prophylaxis:Enoxaparin   Gastric prophylaxis:None   Diet: Regular adult   Disposition: To be determined / Likely home tomorrow       Patient seen and examined with RN         Interval History:   Progress notes in the last 24 hrs by MDT team has been reviewed.   Lorie is doing well  No new complaints  Doing well with therapy   Denies chest pain/ SOB          Review of Systems:   A comprehensive review of systems was performed and found to be negative except: Those that are outlined in interval history              Medications:   I have reviewed this patient's current medications which are outlined in the \"current medication\" section of EPIC             Physical Exam:   Vitals were reviewed  Temp: 97.1  F (36.2  C) Temp src: Oral BP: 110/71   Heart Rate: 87 Resp: 17 SpO2: 94 % O2 Device: None (Room air)      Constitutional:   awake, alert, cooperative, no apparent distress, and appears stated age     Lungs:   No increased work of breathing, good air exchange, clear to auscultation bilaterally, no crackles or wheezing     Cardiovascular:   Normal apical " impulse, regular rate and rhythm, normal S1 and S2, no S3 or S4, and no murmur noted     Musculoskeletal:   Rt hip with no bruising or bleeding  No distal neurovascular deficit      Neurologic:   Awake, alert, oriented to name, place and time.  Cranial nerves II-XII are grossly intact.             Data:     Most recent labs have been evaluated and relevant labs are outlined below :    BMP  Recent Labs  Lab 01/18/18  0544 01/17/18  1200   CR 0.58 0.57     CBC  Recent Labs  Lab 01/18/18  0544 01/17/18  1200   HGB 11.3*  --    PLT  --  187     INRNo lab results found in last 7 days.  LFTsNo lab results found in last 7 days.   PANCNo lab results found in last 7 days.      Dr DEAN Medina MD  Hospitalist ( Internal medicine)  Pager: [phone]

## 2018-01-18 NOTE — PLAN OF CARE
Problem: Patient Care Overview  Goal: Interdisciplinary Rounds/Family Conf  Pt A&O x's 4. VSS, heart gabbie, in mid 40s to 50s, per pt that is her base line. BP sost  Pt denies dizziness or light headedness. Afebrile. 02 sats in 90 on RA. Lungs clear. Denies SOB, CP and nausea. Tolerating regular diet. Bowel sound active in all quadrants. No BM but passing gas. Voiding adequately into the commode. Pain well managed with 5 mg of oxycodone. CMS intact, denies N/T. Dressing clean dry and intact. pcds and stockings on.  PIV patent and infusing . Pt slept between care and is ble to make needs known, call light with in reach. Will continue to monitor.

## 2018-01-18 NOTE — PROGRESS NOTES
Orthopaedic Surgery Progress Note 01/18/2018    E: No acute events overnight.    S: Pain well controlled. Denies numbness or tingling.  Up walking the hallways as early as yesterday afternoon.  Tolerating a regular diet without n/v.  No current complaints.  Plan of care reviewed and questions answered.    O:  Temp: 96.4  F (35.8  C) Temp src: Oral BP: 100/52   Heart Rate: 50 Resp: 17 SpO2: 95 % O2 Device: None (Room air) Oxygen Delivery: 1.5 LPM    Exam:  Gen: Very pleasant.  No acute distress, resting comfortably in bed.  Resp: Non-labored breathing    MSK: Dressing C/D/I - Aquacel in place, small folding of inferior corner.  Reinforce if needed.  Motor:  Tibialis anterior, gastroc/soleus, EHL strength 5/5   Sensory: SILT to superficial peroneal, deep peroneal, saphenous, sural, and tibial nerve territories  Circulation: palpable DP and TP, foot warm and well perfused        Recent Labs  Lab 01/18/18  0544 01/17/18  1200   HGB 11.3*  --    PLT  --  187           Assessment: Lorie Krishnamurthy is a 57 year old female s/p Right VALARIE on 1/17 with Dr. Betancourt. Doing well.      Plan:  Ortho Primary  Activity: Up with assist.  Weight bearing status: WBAT  Antibiotics: Ancef x 24 hours.  Diet: Begin with clear fluids and progress diet as tolerated.  DVT prophylaxis: Lovenox x2 weeks, ASA x2 weeks, and mechanical while in the hospital  Wound Care: Keep Aquacel in place until POD#7.  Pain management: transition from IV to orals as tolerated.   X-rays: Post-op XR stable, no need for further XR this hospitalization without acute event.  Physical Therapy: Strengthening, ROM, ADL's.  Occupational Therapy: ADL's.  Labs: Trend Hgb on POD #1, 2  Consults: PT, OT. Medicine, appreciate assistance in caring for this patient.  Follow-up: Clinic with Dr. Betancourt in 6 weeks with f/u x-rays needed.    Disposition: Pending progress with therapies, pain control on orals, and medical stability, anticipate discharge to home on POD #2.      Wojciech  MD Leighton 01/18/2018  Orthopaedic Surgery Resident, PGY-1  Pager: [phone]    For questions about this patient, please attempt to contact me at my pager prior to contacting the orthopaedics resident on call. Thank you!

## 2018-01-18 NOTE — PLAN OF CARE
Problem: Patient Care Overview  Goal: Plan of Care/Patient Progress Review  Discharge Planner OT   Patient plan for discharge: Hotel for one week, friend's house for one week all with help and then flying home to [place] after 2 week check up  Current status: Patient met all OT goals, doing very well.    Barriers to return to prior living situation: None  Recommendations for discharge: Home  Rationale for recommendations: No further inpatient or home OT needs    Occupational Therapy Discharge Summary    Reason for therapy discharge:    All goals and outcomes met, no further needs identified.    Progress towards therapy goal(s). See goals on Care Plan in [facility] electronic health record for goal details.  Goals met    Therapy recommendation(s):    No further therapy is recommended.           Entered by: Annalee Herrera 01/18/2018 11:32 AM

## 2018-01-18 NOTE — PROGRESS NOTES
A&O x 4 VSS, LS clear. Neuros & CMS intact. Capnography completed at 1030 today. IV fluids d/c'd. Pain well controlled with 5mg Oxycodone every 3 hours. BS + LBM prior to surgery but passing flatus. Voiding adequate amounts. Tolerating a regular diet without issue. Denies CP or SOB. Dressing CDI. Up with SBA.

## 2018-01-19 VITALS
WEIGHT: 193.56 LBS | TEMPERATURE: 100.1 F | DIASTOLIC BLOOD PRESSURE: 52 MMHG | HEIGHT: 68 IN | RESPIRATION RATE: 16 BRPM | SYSTOLIC BLOOD PRESSURE: 114 MMHG | HEART RATE: 52 BPM | OXYGEN SATURATION: 98 % | BODY MASS INDEX: 29.34 KG/M2

## 2018-01-19 LAB
HGB BLD-MCNC: 11.4 G/DL (ref 11.7–15.7)
HGB BLD-MCNC: NORMAL G/DL (ref 11.7–15.7)

## 2018-01-19 PROCEDURE — 99207 ZZC CDG-MDM COMPONENT: MEETS LOW - DOWN CODED: CPT | Performed by: INTERNAL MEDICINE

## 2018-01-19 PROCEDURE — 25000128 H RX IP 250 OP 636: Performed by: ORTHOPAEDIC SURGERY

## 2018-01-19 PROCEDURE — 36415 COLL VENOUS BLD VENIPUNCTURE: CPT | Performed by: ORTHOPAEDIC SURGERY

## 2018-01-19 PROCEDURE — 85018 HEMOGLOBIN: CPT | Performed by: ORTHOPAEDIC SURGERY

## 2018-01-19 PROCEDURE — 99231 SBSQ HOSP IP/OBS SF/LOW 25: CPT | Performed by: INTERNAL MEDICINE

## 2018-01-19 PROCEDURE — 25000132 ZZH RX MED GY IP 250 OP 250 PS 637: Performed by: ORTHOPAEDIC SURGERY

## 2018-01-19 RX ADMIN — RANITIDINE 150 MG: 150 TABLET ORAL at 08:44

## 2018-01-19 RX ADMIN — ACETAMINOPHEN 975 MG: 325 TABLET, FILM COATED ORAL at 00:02

## 2018-01-19 RX ADMIN — OXYCODONE HYDROCHLORIDE 10 MG: 5 TABLET ORAL at 00:02

## 2018-01-19 RX ADMIN — OXYCODONE HYDROCHLORIDE 5 MG: 5 TABLET ORAL at 04:35

## 2018-01-19 RX ADMIN — ACETAMINOPHEN 975 MG: 325 TABLET, FILM COATED ORAL at 08:43

## 2018-01-19 RX ADMIN — OXYCODONE HYDROCHLORIDE 5 MG: 5 TABLET ORAL at 08:53

## 2018-01-19 RX ADMIN — SENNOSIDES AND DOCUSATE SODIUM 2 TABLET: 8.6; 5 TABLET ORAL at 08:43

## 2018-01-19 RX ADMIN — OXYCODONE HYDROCHLORIDE 5 MG: 5 TABLET ORAL at 13:09

## 2018-01-19 RX ADMIN — SERTRALINE HYDROCHLORIDE 50 MG: 50 TABLET ORAL at 08:44

## 2018-01-19 RX ADMIN — ENOXAPARIN SODIUM 40 MG: 40 INJECTION SUBCUTANEOUS at 04:35

## 2018-01-19 NOTE — PROGRESS NOTES
Care Coordinator- Discharge Planning     Admission Date/Time:  1/17/2018  Attending MD:  Sonny Betancourt MD     Data  Date of initial CC assessment: 1/19/2018  Chart reviewed, discussed with interdisciplinary team.   Patient was admitted for:   1. Status post hip surgery         Assessment  Concerns with insurance coverage for discharge needs: None.  Current Living Situation: [place]. Staying at a hotel.  Support System: Involved  Services Involved: Home Care  Transportation: Family or Friend will provide  Barriers to Discharge: None        Coordination of Care and Referrals: Provided patient/family with options for Home Care. Met with patient at bedside to discuss discharge planning. A referral for physical therapy was sent to [facility]. Emmy is from out of state and is staying at [facility], [address] [place] [address], Phone: [phone]. No further discharge concerns at this time. All home care orders completed. CC to follow as needed.    [facility]  Phone  [phone]  Fax  [phone]          Plan  Anticipated Discharge Date:  1/19/2018  Anticipated Discharge Plan:  Home with Home Care    CTS Handoff completed:  YES    Katey Cosme RN, BSN  Care Coordinator, 8A  Phone [phone]  Pager [phone]

## 2018-01-19 NOTE — PROGRESS NOTES
PIV removed for discharge. Discharge eduction complete, Lovenox teaching complete. Medications given to patient. Friends came to transport patient to hotel where they will stay for one week. Home care and PT is set up, patient cleared for discharge by medical and ortho teams. Transportation called.

## 2018-01-19 NOTE — PROGRESS NOTES
Orthopaedic Surgery Progress Note 01/19/2018    E: No acute events overnight.    S: Pain well controlled. Denies numbness or tingling.  Passed PT and is walking independently.  Tolerating a regular diet without n/v.  No current complaints.  Had a bout of muscle spasming after PT today - states that has improved.  Plan of care reviewed and questions answered.    O:  Temp: 98.2  F (36.8  C) Temp src: Oral BP: 107/57 Pulse: 52 Heart Rate: 59 Resp: 16 SpO2: 95 % O2 Device: None (Room air)      Exam:  Gen: Very pleasant.  No acute distress, resting comfortably in bed.  Resp: Non-labored breathing    MSK: Dressing C/D/I - Aquacel in place, small folding of inferior corner.  Reinforce if needed.  Motor:  Tibialis anterior, gastroc/soleus, EHL strength 5/5   Sensory: SILT to superficial peroneal, deep peroneal, saphenous, sural, and tibial nerve territories  Circulation: palpable DP and TP, foot warm and well perfused        Recent Labs  Lab 01/18/18  0544 01/17/18  1200   HGB 11.3*  --    PLT  --  187           Assessment: Lorie Krishnamurthy is a 57 year old female s/p Right VALARIE on 1/17 with Dr. Betancourt. Doing well.    Plan:  Ortho Primary  Activity: Up with assist.  Weight bearing status: WBAT  Antibiotics: Ancef x 24 hours.  Diet: Begin with clear fluids and progress diet as tolerated.  DVT prophylaxis: Lovenox x2 weeks, ASA x2 weeks, and mechanical while in the hospital  Wound Care: Keep Aquacel in place until POD#7.  Pain management: transition from IV to orals as tolerated.   X-rays: Post-op XR stable, no need for further XR this hospitalization without acute event.  Physical Therapy: Strengthening, ROM, ADL's.  Occupational Therapy: ADL's.  Labs: Trend Hgb on POD #1, 2  Consults: PT, OT. Medicine, appreciate assistance in caring for this patient.  Follow-up: Clinic with Dr. Betancourt in 6 weeks with f/u x-rays needed.    Disposition: Pending progress with therapies, pain control on orals, and medical stability, anticipate  discharge to home today.      Wojciech Manzanares MD 01/19/2018  Orthopaedic Surgery Resident, PGY-1  Pager: [phone]    For questions about this patient, please attempt to contact me at my pager prior to contacting the orthopaedics resident on call. Thank you!

## 2018-01-19 NOTE — PROGRESS NOTES
"[facility], [place]   Internal Medicine Daily Note          Assessment and Plan:     Assessment:   57 yr old female patient with no past cardi/respiratory history, who is S/P Rt hip arthroplasty     Plan:  S/P Rt Hip Arthroplasty, POD 2:  Management by primary team. Please see their notes for further details  Pain control with acetaminophen 975 mg every 8 hrs for 3 days, Oxycodone 5-10 mg every 3 hrs PRN and IV hydromorphone 0.3-0.5 mg q 3 hrs for breakthrough pain   DVT prophylaxis with  SCDs while in hospital, and Lovenox  for 4 weeks  PT/OT as per protocol   Incentive spirometry and aggressive bowel regimen  We will monitor for acute blood loss anemia. Pre op Hgb at 15.3. Post op 11.3     Resume home medications ( atorvastatin, ranitidine and Zoloft)      Consulting teams: Internal medicine   Code status: Full   DVT Prophylaxis:Enoxaparin   Gastric prophylaxis:None   Diet: Regular adult   Disposition: Medically stable to discharge home today       Patient seen and examined with RN         Interval History:   Progress notes in the last 24 hrs by MDT team has been reviewed.   Lorie is doing well  Some pain overnight, but doing well this morning   Mild low grade temp this morning, but no cough or subjective feeling of feverishness  Eating and drinking well          Review of Systems:   A comprehensive review of systems was performed and found to be negative except: Those that are outlined in interval history              Medications:   I have reviewed this patient's current medications which are outlined in the \"current medication\" section of EPIC             Physical Exam:   Vitals were reviewed  Temp: 100.1  F (37.8  C) Temp src: Oral BP: 114/52 Pulse: 52 Heart Rate: 58 Resp: 16 SpO2: 98 % O2 Device: None (Room air)      Constitutional:   awake, alert, cooperative, no apparent distress, and appears stated age     Lungs:   No increased work of breathing, good air exchange, clear to " auscultation bilaterally, no crackles or wheezing     Cardiovascular:   Normal apical impulse, regular rate and rhythm, normal S1 and S2, no S3 or S4, and no murmur noted     Musculoskeletal:   Rt hip with no bruising or bleeding  No distal neurovascular deficit      Neurologic:   Awake, alert, oriented to name, place and time.  Cranial nerves II-XII are grossly intact.             Data:     Most recent labs have been evaluated and relevant labs are outlined below :    BMP    Recent Labs  Lab 01/18/18  0544 01/17/18  1200   CR 0.58 0.57     CBC  Recent Labs  Lab 01/19/18  0900  01/17/18  1200   HGB 11.4*  < >  --    PLT  --   --  187   < > = values in this interval not displayed.  INRNo lab results found in last 7 days.  LFTsNo lab results found in last 7 days.   PANCNo lab results found in last 7 days.      Dr DEAN Medina MD  Hospitalist ( Internal medicine)  Pager: [phone]

## 2018-01-19 NOTE — DISCHARGE SUMMARY
Orthopaedic Surgery Discharge Summary          Name:  Lorie Krishnamurthy  MRN:  [id number]  YOB: 1960      Date of Admission:  1/17/2018  Date of Discharge::  1/19/2018  Discharge Physician:  Dr. Betancourt/Dr. Manzanares               Admission Diagnoses:   Right hip osteoarthritis          Discharge Diagnosis:   Status-post right total hip arthroplasty          Procedures:   Surgery: Right VALARIE  Date: 1/17/2018          Discharge Medications:     Current Discharge Medication List      START taking these medications    Details   oxyCODONE IR (ROXICODONE) 5 MG tablet Take 1-2 tablets (5-10 mg) by mouth every 4 hours as needed for other (pain control or improvement in physical function. Hold dose for analgesic side effects.)  Qty: 60 tablet, Refills: 0    Associated Diagnoses: Status post hip surgery      enoxaparin (LOVENOX) 40 MG/0.4ML injection Inject 0.4 mLs (40 mg) Subcutaneous every 24 hours  Qty: 12 Syringe, Refills: 0    Associated Diagnoses: Status post hip surgery      senna-docusate (SENOKOT-S;PERICOLACE) 8.6-50 MG per tablet Take 1-2 tablets by mouth 2 times daily as needed for constipation  Qty: 50 tablet, Refills: 0    Associated Diagnoses: Status post hip surgery         CONTINUE these medications which have CHANGED    Details   acetaminophen (TYLENOL) 325 MG tablet Take 2 tablets (650 mg) by mouth every 4 hours as needed for pain  Qty: 100 tablet, Refills: 0    Associated Diagnoses: Status post hip surgery         CONTINUE these medications which have NOT CHANGED    Details   ATORVASTATIN CALCIUM PO Take 40 mg by mouth daily      metroNIDAZOLE (METROCREAM) 0.75 % cream Apply  topically 2 times daily.  Qty: 45 g, Refills: 4    Associated Diagnoses: Acne rosacea      sertraline (ZOLOFT) 100 MG tablet Take 1 tablet by mouth daily.  Qty: 90 tablet, Refills: 3    Associated Diagnoses: Generalized anxiety disorder      ranitidine (ZANTAC) 150 MG tablet Take 1 tablet by mouth 2 times daily.  Qty: 180  tablet, Refills: 0    Associated Diagnoses: History of total hip arthroplasty      Cholecalciferol (VITAMIN D) 1000 UNIT capsule Take 1 capsule by mouth daily.      Calcium Carbonate-Vitamin D (CALCIUM 600 + D OR) Take 1 tablet by mouth daily.      clindamycin (CLEOCIN) 300 MG capsule Take 2 capsules by mouth See Admin Instructions. Take 2 capsules one hour before dental procedure  Qty: 4 capsule, Refills: 0    Associated Diagnoses: S/P hip replacement                   Consultations:   PT/OT  Internal Medicine          Brief History of Illness:   Mrs. Krishnamurthy is a 57 year-old female with a history of chronic right hip pain that was admitted to [facility] for replacement of her right hip.           Hospital Course:   The patient was admitted to the hospital after the above listed procedure.  Hospital course was uneventful.    Patient worked with PT and OT beginning POD#1.  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  She was in safe and stable condition at time of discharge.    Exam at time of Discharge:     Gen: Very pleasant.  No acute distress, resting comfortably in bed.  Resp: Non-labored breathing on RA.  MSK: Dressing C/D/I - Aquacel in place, small folding of inferior corner.  Reinforce if needed.  Motor: Tibialis anterior, gastroc/soleus, EHL strength 5/5                        Sensory: SILT to superficial peroneal, deep peroneal, saphenous, sural, and tibial nerve territories  Circulation: palpable DP and TP, foot warm and well perfused    DVT prophylaxis: Lovenox x2 weeks, ASA x2 weeks          Discharge Instructions and Follow-Up:     Discharge Procedure Orders  PHYSICAL THERAPY REFERRAL   Referral Type: Rehab Therapy Physical Therapy     Reason for your hospital stay    Order Comments: You were admitted to the hospital following your total hip arthroplasty.     Adult [facility] Follow-up and recommended labs and tests   Order Comments: You are to return to clinic in 2 weeks for wound check with the clinic nurse. Approximately 6 weeks after your surgery, you will be scheduled for an appointment with Dr. Betancourt. At this time, AP pelvis imaging will be obtained.    If you need to schedule your 2 and 6 week postoperative visits or haven't received confirmation regarding these visits, please call one of the following numbers within 7 days of discharge.    For patients that see Dr. Betancourt at:   -[facility]: [phone]  [facility]: [phone]  [facility]: [phone]     Activity   Order Comments: Your activity upon discharge will be as tolerated. You must maintain posterior hip precautions to the operative hip (see below) for the next 12 weeks with no exceptions.     POSTERIOR HIP PRECAUTIONS:  1) no hip flexion >90 degrees (no bending down at the waist)  2) no internal rotation past neutral (no pivoting)  3) no adduction past midline (no crossing your legs)   Order Specific Question Answer Comments   Is discharge order? Yes      When to contact your care team   Order Comments: CALL YOUR PHYSICIAN IF:  -Pain in your hip persists or worsens in the first few days after surgery.  -Excessive redness or drainage of cloudy or bloody material from the wounds (Clear red tinted fluid and some mild drainage should be expected). Drainage of any kind 5 days after surgery should be reported to the doctor.  -You have a temperature elevation greater than 101.5    -You have pain, swelling or redness in your calf.  -You have numbness or weakness in your leg or foot.      You may contact your physician at [phone] during business hours.    For after hours or weekend calls, you may contact the hospital at [phone] and ask  for the on-call orthopedic resident     Wound care and dressings   Order Comments: You have a clean Aquacel dressing on your surgical wound. This dressing should stay in place for 7 days to allow the incision to heal. After 7 days, you may change the dressing. Please use sterile 4x4 gauze dressings with tape over top to secure the dressing. If the dressing becomes wet or saturated with wound drainage, it is appropriate to change the dressing. If drainage persists from the incision site to the extent that it is frequently saturating the dressing, please contact your clinic nurse.     Discharge Instructions   Order Comments: FERNANDO/TOTAL HIP ARTHROPLASTY POST OPERATIVE INSTRUCTIONS    A.  COMFORT:  -Swelling: An ice pack can be an effective means of controlling swelling and discomfort. Place a thin towel between your skin and the ice pack and apply to skin for 20 minutes.   -Pain Medication: Take medication as prescribed, but only as often as necessary.  Avoid alcohol and driving if you are taking pain medication.  Remember that Tylenol can be used for pain relief as well, as you wean off the narcotics.  Maximum Tylenol dose for a single day is 4000 mg.            B.  ACTIVITIES:  -Activity: Posterior hip precautions should be maintained on the operative hip for at least 12 weeks.  -Exercises: Point and flex your feet and wiggle your toes, move your ankle around in a [place], to help prevent complications such as blood clotting in your legs.   -Weight bearing status: You may weight bear on the operative leg as tolerated  -Physical therapy: Follow-up with physical therapy as discussed with your provider  -Driving: Driving is NOT permitted until allowed by your provider.  Athletic activities: Athletic activities, such as swimming, bicycling, jogging, running and stop-and-go-sports should be avoided until allowed by your doctor.  -Return to work: May returned to work when allowed by your provider.       C. INCISION CARE:     -Keep the initial post-op dressing on for 7 days, as discussed above. You may shower 48 hours after surgery, however the dressing on your hip should remain in place during showering. It is acceptable for water to run over the dressing, but you are not to soak or submerge your wound underwater. The steri-strips (small white tape that is directly on the incision areas) should be left on until they fall off or are removed at your first office visit.     Diet   Order Comments: You may return to your regular, pre-surgery diet unless instructed otherwise by your provider.   Order Specific Question Answer Comments   Is discharge order? Yes                 Discharge Disposition:   Home/Self-care     Patient was discussed with Dr. Betancourt on day of discharge.    Wojciech Manzanares MD  Orthopaedic Surgery PGY-1

## 2018-01-19 NOTE — PLAN OF CARE
Problem: Patient Care Overview  Goal: Plan of Care/Patient Progress Review    VS: Temp slightly elevated, 100.1, Dr. Medina notified during morning rounds, no new orders.    O2: Maintaining O2 SATS in upper 90s on room air.    Output: Voiding independently    Last BM: 1/17/2018; Senna given, declined suppository. Fluids encouraged.    Activity: Up with SBA x  1 and walker    Skin: Intact ; UTV under incisional dressing    Pain: Per patient report, pain well controlled with 5 mg Oxycodone PRN    CMS: Intact    Dressing: Aquasel dressing CDI    Diet: Regular; good appetite.    LDA: PIV patent and saline locked. No drains, no jay    Equipment: Hip abductor pillow, PCDS    Plan: D/C today

## 2018-01-19 NOTE — PLAN OF CARE
Problem: Hip Arthroplasty (Total, Partial) (Adult)  Goal: Signs and Symptoms of Listed Potential Problems Will be Absent, Minimized or Managed (Hip Arthroplasty)  Signs and symptoms of listed potential problems will be absent, minimized or managed by discharge/transition of care (reference Hip Arthroplasty (Total, Partial) (Adult) CPG).     VS: Alert and oriented x4. VSS and afebrile.    O2: O2 sats > 90%, in room air and denies SOB.    Output: Voided spontaneously without difficulty per pt report.    Last BM: 1/18/18 and passing gas.   Activity: WBaT,independent at room after passed with PT   Skin: Intact with exceptions with incision   Pain: Controlled with PRN Oxycodone 5-10mg PO q3H and ice pack    CMS: Intact. Denies numbness and tingling sensation.   Dressing: Aquacel in placed and C/D/I   Diet: Regular   LDA: PIV at R top hand : patent and SL.    Equipment: PCD, FWW   Plan: Possible d/c today   Additional Info:

## 2018-01-19 NOTE — PLAN OF CARE
Problem: Hip Arthroplasty (Total, Partial) (Adult)  Goal: Signs and Symptoms of Listed Potential Problems Will be Absent, Minimized or Managed (Hip Arthroplasty)  Signs and symptoms of listed potential problems will be absent, minimized or managed by discharge/transition of care (reference Hip Arthroplasty (Total, Partial) (Adult) CPG).   Outcome: Improving  Care from 1100  VS: VSS   O2: >90% on RA   Output: Voiding adequate amounts   Last BM: 1/17   Activity: WBAT; ind in room after passing PT   Skin: Intact except for incision   Pain: Managed with oxycodone. Vistaril x1 for muscle spasms/cramps    CMS: Intact   Dressing: Aquacel CDI   Diet: Tolerating regular diet   LDA: PIV SL   Equipment: FWW, teds, PCDs, pillow   Plan: Likely home tomorrow   Additional Info: Pt passed PT/OT; safe to ambulate independently. Walked the halls this evening.

## 2018-01-19 NOTE — PROGRESS NOTES
[facility]   Met with pt to discuss plans for HC.  [facility]  and has agreed to have [facility] follow with services of PT. Patient care support center processing referral.  Pt verbalized understanding that initial visit is scheduled for 01 20 18 or 01 21 18.    Pt has 24 hour phone number for [facility] for any questions or concerns.

## 2018-01-20 ENCOUNTER — MEDICAL CORRESPONDENCE (OUTPATIENT)
Dept: HEALTH INFORMATION MANAGEMENT | Facility: CLINIC | Age: 58
End: 2018-01-20

## 2018-01-20 LAB
BLD PROD TYP BPU: NORMAL
BLD PROD TYP BPU: NORMAL
BLD UNIT ID BPU: 0
BLD UNIT ID BPU: 0
BLOOD PRODUCT CODE: NORMAL
BLOOD PRODUCT CODE: NORMAL
BPU ID: NORMAL
BPU ID: NORMAL
TRANSFUSION STATUS PATIENT QL: NORMAL

## 2018-01-31 ENCOUNTER — OFFICE VISIT (OUTPATIENT)
Dept: ORTHOPEDICS | Facility: CLINIC | Age: 58
End: 2018-01-31

## 2018-01-31 DIAGNOSIS — Z09 SURGERY FOLLOW-UP: Primary | ICD-10-CM

## 2018-01-31 NOTE — PROGRESS NOTES
"Reason for visit:    Lorie Krishnamurthy came in to the clinic for a two week post op check.    Her surgery was done on 1/17/18 by Dr Betancourt.  She had right total hip arthroplasty.     Assessment:    Lorie came into the clinic ambulating well with a cane. Patient states her pain averages 2-3 out of 10 and she manages it with 1 oxycodone in the afternoon and 1 at bedtime; patient does state yesterday she was able to do just 1 for the whole day (patient does not need a refill). She supplements with Tylenol.  She has 2 Lovenox shots left and then will transition to 1 full strength aspirin per day for 14 days. Patient is flying back home this afternoon. Patient had 2 home physical therapy visits and then was discharged due to how well she is doing (this is patient's 2nd total hip). Patient states she is sleeping \"great\" and has no issues with constipation.     The Surgical wounds were exposed and found to be healing well without signs and symptoms of infection; so the remaining Steri-Strips were removed and the suture end trimmed. Patient did state she had some drainage yesterday; on evaluation, there is no drainage noted today. Advised patient to keep an eye on her incision; if she begins to experience drainage again, she should contact us. Patient verbalizes understanding of signs and symptoms of infection.    Plan:    She has an appointment to see Dr. Betancourt on 3/1/18. At that time Dr. Betancourt will determine further restrictions.    She has our phone number and will call with questions or problems.        "

## 2018-01-31 NOTE — MR AVS SNAPSHOT
After Visit Summary   1/31/2018    Lorie Krishnamurthy    MRN: [id number]           Patient Information     Date Of Birth          1960        Visit Information        Provider Department      1/31/2018 8:00 AM Nurse, Nicole PONCE [facility]        Today's Diagnoses     Surgery follow-up    -  1       Follow-ups after your visit        Your next 10 appointments already scheduled     Mar 01, 2018  3:30 PM CST   (Arrive by 3:15 PM)   RETURN HIP with Sonny Betancourt MD   [facility] ([facility])    [address] [place] [id number] [phone]              Who to contact     Please call your clinic at [phone] to:    Ask questions about your health    Make or cancel appointments    Discuss your medicines    Learn about your test results    Speak to your doctor   If you have compliments or concerns about an experience at your clinic, or if you wish to file a complaint, please contact [facility] Patient Relations at [phone] or email us at [email or web address]         Additional Information About Your Visit        MyChart Information     The [employer] gives you secure access to your electronic health record. If you see a primary care provider, you can also send messages to your care team and make appointments. If you have questions, please call your primary care clinic.  If you do not have a primary care provider, please call [phone] and they will assist you.      [employer] is an electronic gateway that provides easy, online access to your medical records. With [employer], you can request a clinic appointment, read your test results, renew a prescription or communicate with your care team.     To access your existing account, please contact your [facility] or call [phone] for assistance.        Care EveryWhere ID     This is your Care EveryWhere ID.  This could be used by other organizations to access your [place] medical records  [id number]         Blood Pressure from Last 3 Encounters:   01/19/18 114/52   11/30/12 116/69   05/02/12 119/69    Weight from Last 3 Encounters:   01/17/18 87.8 kg (193 lb 9 oz)   10/26/17 86.9 kg (191 lb 9.6 oz)   11/30/12 78.7 kg (173 lb 6.4 oz)              Today, you had the following     No orders found for display         Today's Medication Changes          These changes are accurate as of 1/31/18  8:44 AM.  If you have any questions, ask your nurse or doctor.               These medicines have changed or have updated prescriptions.        Dose/Directions    sertraline 100 MG tablet   Commonly known as:  ZOLOFT   This may have changed:  how much to take   Used for:  Generalized anxiety disorder        Dose:  100 mg   Take 1 tablet by mouth daily.   Quantity:  90 tablet   Refills:  3                Primary Care Provider Fax #    Physician No Ref-Primary [phone]       No address on file        Equal Access to Services     NIKKY QUAN : Romi plascencia Sotamika, waaxda luqadaha, qaybta kaalmada ale, dary louie . So [facility] [phone].    ATENCIÓN: Si habla español, tiene a becerra disposición servicios gratuitos de asistencia lingüística. Llame al [phone].    We comply with applicable federal civil rights laws and [place] laws. We do not discriminate on the basis of race, color, national origin, age, disability, sex, sexual orientation, or gender identity.            Thank you!     Thank you for choosing [facility]  for your care. Our goal is always to provide you with excellent care. Hearing back from our patients is one way we can continue to improve our services. Please take a few minutes to complete the written survey that you may receive in the mail after your visit with us. Thank you!             Your Updated Medication List - Protect others around you: Learn  how to safely use, store and throw away your medicines at www.disposemymeds.org.          This list is accurate as of 1/31/18  8:44 AM.  Always use your most recent med list.                   Brand Name Dispense Instructions for use Diagnosis    acetaminophen 325 MG tablet    TYLENOL    100 tablet    Take 2 tablets (650 mg) by mouth every 4 hours as needed for pain    Status post hip surgery       aspirin  MG EC tablet   Start taking on:  2/4/2018     14 tablet    Take 1 tablet (325 mg) by mouth daily for 14 days Start taking when lovenox is complete    Status post hip surgery       ATORVASTATIN CALCIUM PO      Take 40 mg by mouth daily        CALCIUM 600 + D PO      Take 1 tablet by mouth daily.        clindamycin 300 MG capsule    CLEOCIN    4 capsule    Take 2 capsules by mouth See Admin Instructions. Take 2 capsules one hour before dental procedure    S/P hip replacement       enoxaparin 40 MG/0.4ML injection    LOVENOX    5.6 mL    Inject 0.4 mLs (40 mg) Subcutaneous every 24 hours for 14 days    Status post hip surgery       metroNIDAZOLE 0.75 % cream    METROCREAM    45 g    Apply  topically 2 times daily.    Acne rosacea       oxyCODONE IR 5 MG tablet    ROXICODONE    60 tablet    Take 1-2 tablets (5-10 mg) by mouth every 4 hours as needed for other (pain control or improvement in physical function. Hold dose for analgesic side effects.)    Status post hip surgery       ranitidine 150 MG tablet    ZANTAC    180 tablet    Take 1 tablet by mouth 2 times daily.    History of total hip arthroplasty       senna-docusate 8.6-50 MG per tablet    SENOKOT-S;PERICOLACE    50 tablet    Take 1-2 tablets by mouth 2 times daily as needed for constipation    Status post hip surgery       sertraline 100 MG tablet    ZOLOFT    90 tablet    Take 1 tablet by mouth daily.    Generalized anxiety disorder       vitamin D 1000 UNITS capsule      Take 1 capsule by mouth daily.

## 2018-02-19 ASSESSMENT — ACTIVITIES OF DAILY LIVING (ADL)
ADL_MEAN: .52
ADL_SUBSCALE_SCORE: 86.76
ADL_SUM: 9

## 2018-02-19 ASSESSMENT — HOOS S4: HOW SEVERE IS YOUR HIP JOINT STIFFNESS AFTER FIRST WAKENING IN THE MORNING?: MILD

## 2018-02-21 DIAGNOSIS — Z96.641 STATUS POST TOTAL REPLACEMENT OF RIGHT HIP: Primary | ICD-10-CM

## 2018-02-22 ENCOUNTER — OFFICE VISIT (OUTPATIENT)
Dept: ORTHOPEDICS | Facility: CLINIC | Age: 58
End: 2018-02-22
Payer: COMMERCIAL

## 2018-02-22 ENCOUNTER — RADIANT APPOINTMENT (OUTPATIENT)
Dept: GENERAL RADIOLOGY | Facility: CLINIC | Age: 58
End: 2018-02-22
Attending: ORTHOPAEDIC SURGERY
Payer: COMMERCIAL

## 2018-02-22 VITALS — WEIGHT: 193.5 LBS | HEIGHT: 68 IN | BODY MASS INDEX: 29.33 KG/M2

## 2018-02-22 DIAGNOSIS — Z96.641 STATUS POST TOTAL REPLACEMENT OF RIGHT HIP: ICD-10-CM

## 2018-02-22 DIAGNOSIS — Z47.89 ORTHOPEDIC AFTERCARE: Primary | ICD-10-CM

## 2018-02-22 RX ORDER — ASPIRIN 325 MG
TABLET, DELAYED RELEASE (ENTERIC COATED) ORAL
COMMUNITY
Start: 2018-02-09

## 2018-02-22 RX ORDER — UBIDECARENONE 200 MG
TABLET ORAL
COMMUNITY
Start: 2018-02-09

## 2018-02-22 NOTE — NURSING NOTE
"Reason For Visit:   Chief Complaint   Patient presents with     Surgical Followup     S/P Right Total Hip Arthroplasty. DOS: 01/17/2018.       Pain Assessment  Patient Currently in Pain: Yes  0-10 Pain Scale: 2  Primary Pain Location: Hip  Pain Orientation: Right  Pain Descriptors: Discomfort  Alleviating Factors: Rest, NSAIDS  Aggravating Factors: Movement, Standing, Walking               HEIGHT: 5' 8.11\", WEIGHT: 193 lbs 8 oz, BMI: Body mass index is 29.33 kg/(m^2).      Current Outpatient Prescriptions   Medication Sig Dispense Refill     aspirin 325 MG EC tablet        Coenzyme Q10 200 MG TABS        acetaminophen (TYLENOL) 325 MG tablet Take 2 tablets (650 mg) by mouth every 4 hours as needed for pain 100 tablet 0     ATORVASTATIN CALCIUM PO Take 40 mg by mouth daily       clindamycin (CLEOCIN) 300 MG capsule Take 2 capsules by mouth See Admin Instructions. Take 2 capsules one hour before dental procedure 4 capsule 0     metroNIDAZOLE (METROCREAM) 0.75 % cream Apply  topically 2 times daily. 45 g 4     sertraline (ZOLOFT) 100 MG tablet Take 1 tablet by mouth daily. (Patient taking differently: Take 50 mg by mouth daily ) 90 tablet 3     ranitidine (ZANTAC) 150 MG tablet Take 1 tablet by mouth 2 times daily. 180 tablet 0     Cholecalciferol (VITAMIN D) 1000 UNIT capsule Take 1 capsule by mouth daily.       Calcium Carbonate-Vitamin D (CALCIUM 600 + D OR) Take 1 tablet by mouth daily.            Allergies   Allergen Reactions     Darvocet [Propoxyphene N-Apap] Hives     Penicillins Hives     Sulfa Drugs Rash     Codeine Sulfate Nausea and Vomiting     Vicodin [Hydrocodone-Acetaminophen] Nausea and Vomiting               "

## 2018-02-22 NOTE — PROGRESS NOTES
"Chief Complaint: Surgical Followup (S/P Right Total Hip Arthroplasty. DOS: 01/17/2018.)       HPI: Lorie Krishnamurthy returns today in follow-up for her right hip. She reports that she is doing well. She is using no pain medication, no assist device, and is advancing well in physical therapy. She is happy with how she is doing so far.     HOOS:  Paion 87.5  Sx 90  ADLs 86  Sports 93.75  QOL 50    Medications and allergies are documented in the EMR and have been reviewed.    Current Outpatient Prescriptions:      aspirin 325 MG EC tablet, , Disp: , Rfl:      Coenzyme Q10 200 MG TABS, , Disp: , Rfl:      acetaminophen (TYLENOL) 325 MG tablet, Take 2 tablets (650 mg) by mouth every 4 hours as needed for pain, Disp: 100 tablet, Rfl: 0     ATORVASTATIN CALCIUM PO, Take 40 mg by mouth daily, Disp: , Rfl:      clindamycin (CLEOCIN) 300 MG capsule, Take 2 capsules by mouth See Admin Instructions. Take 2 capsules one hour before dental procedure, Disp: 4 capsule, Rfl: 0     metroNIDAZOLE (METROCREAM) 0.75 % cream, Apply  topically 2 times daily., Disp: 45 g, Rfl: 4     sertraline (ZOLOFT) 100 MG tablet, Take 1 tablet by mouth daily. (Patient taking differently: Take 50 mg by mouth daily ), Disp: 90 tablet, Rfl: 3     ranitidine (ZANTAC) 150 MG tablet, Take 1 tablet by mouth 2 times daily., Disp: 180 tablet, Rfl: 0     Cholecalciferol (VITAMIN D) 1000 UNIT capsule, Take 1 capsule by mouth daily., Disp: , Rfl:      Calcium Carbonate-Vitamin D (CALCIUM 600 + D OR), Take 1 tablet by mouth daily., Disp: , Rfl:   Allergies: Darvocet [propoxyphene n-apap]; Penicillins; Sulfa drugs; Codeine sulfate; and Vicodin [hydrocodone-acetaminophen]    Physical Exam:  On physical examination the patient appears the stated age, is in no acute distress, affect is appropriate, and breathing is non-labored.  Ht 1.73 m (5' 8.11\")  Wt 87.8 kg (193 lb 8 oz)  BMI 29.33 kg/m2  Body mass index is 29.33 kg/(m^2).  Gait: normal   Incision: healed and " benign  ROM: fluid and painless  Distally, the circulatory, motor, and sensation exam is intact with 5/5 EHL, gastroc-soleus, and tibialis anterior.  Sensation to light touch is intact.  Dorsalis pedis and posterior tibialis pulses are palpable.  There are no sores on the feet, no bruising, and no lymphedema.    X-rays:    I reviewed the x-rays dated today.  Previous films reviewed.    Findings:  Normal progression for a total hip arthroplasty without evidence of loosening or subsidence.    Assessment: doing well  Plan: if she is still in town she will RTC in 6 weeks for recheck. One year for repeat radiographs. Sooner if issues.     Sonny Betancourt    Answers for HPI/ROS submitted by the patient on 2/19/2018   General Symptoms: No  Skin Symptoms: No  HENT Symptoms: No  EYE SYMPTOMS: No  HEART SYMPTOMS: No  LUNG SYMPTOMS: No  INTESTINAL SYMPTOMS: No  URINARY SYMPTOMS: No  GYNECOLOGIC SYMPTOMS: No  BREAST SYMPTOMS: No  SKELETAL SYMPTOMS: No  BLOOD SYMPTOMS: No  NERVOUS SYSTEM SYMPTOMS: No  MENTAL HEALTH SYMPTOMS: No

## 2018-02-22 NOTE — LETTER
2/22/2018       RE: Lorie Krishnamurthy  [address]  [facility] [address]     Dear Colleague,    Thank you for referring your patient, Lorie Krishnamurthy, to the [facility] at [facility]. Please see a copy of my visit note below.    Chief Complaint: Surgical Followup (S/P Right Total Hip Arthroplasty. DOS: 01/17/2018.)       HPI: Lorie Krishnamurthy returns today in follow-up for her right hip. She reports that she is doing well. She is using no pain medication, no assist device, and is advancing well in physical therapy. She is happy with how she is doing so far.     HOOS:  Paion 87.5  Sx 90  ADLs 86  Sports 93.75  QOL 50    Medications and allergies are documented in the EMR and have been reviewed.    Current Outpatient Prescriptions:      aspirin 325 MG EC tablet, , Disp: , Rfl:      Coenzyme Q10 200 MG TABS, , Disp: , Rfl:      acetaminophen (TYLENOL) 325 MG tablet, Take 2 tablets (650 mg) by mouth every 4 hours as needed for pain, Disp: 100 tablet, Rfl: 0     ATORVASTATIN CALCIUM PO, Take 40 mg by mouth daily, Disp: , Rfl:      clindamycin (CLEOCIN) 300 MG capsule, Take 2 capsules by mouth See Admin Instructions. Take 2 capsules one hour before dental procedure, Disp: 4 capsule, Rfl: 0     metroNIDAZOLE (METROCREAM) 0.75 % cream, Apply  topically 2 times daily., Disp: 45 g, Rfl: 4     sertraline (ZOLOFT) 100 MG tablet, Take 1 tablet by mouth daily. (Patient taking differently: Take 50 mg by mouth daily ), Disp: 90 tablet, Rfl: 3     ranitidine (ZANTAC) 150 MG tablet, Take 1 tablet by mouth 2 times daily., Disp: 180 tablet, Rfl: 0     Cholecalciferol (VITAMIN D) 1000 UNIT capsule, Take 1 capsule by mouth daily., Disp: , Rfl:      Calcium Carbonate-Vitamin D (CALCIUM 600 + D OR), Take 1 tablet by mouth daily., Disp: , Rfl:   Allergies: Darvocet [propoxyphene n-apap]; Penicillins; Sulfa drugs; Codeine sulfate; and Vicodin [hydrocodone-acetaminophen]    Physical  "Exam:  On physical examination the patient appears the stated age, is in no acute distress, affect is appropriate, and breathing is non-labored.  Ht 1.73 m (5' 8.11\")  Wt 87.8 kg (193 lb 8 oz)  BMI 29.33 kg/m2  Body mass index is 29.33 kg/(m^2).  Gait: normal   Incision: healed and benign  ROM: fluid and painless  Distally, the circulatory, motor, and sensation exam is intact with 5/5 EHL, gastroc-soleus, and tibialis anterior.  Sensation to light touch is intact.  Dorsalis pedis and posterior tibialis pulses are palpable.  There are no sores on the feet, no bruising, and no lymphedema.    X-rays:    I reviewed the x-rays dated today.  Previous films reviewed.    Findings:  Normal progression for a total hip arthroplasty without evidence of loosening or subsidence.    Assessment: doing well  Plan: if she is still in town she will RTC in 6 weeks for recheck. One year for repeat radiographs. Sooner if issues.     Again, thank you for allowing me to participate in the care of your patient.      Sincerely,    Sonny Betancourt MD      "

## 2019-09-30 ENCOUNTER — HEALTH MAINTENANCE LETTER (OUTPATIENT)
Age: 59
End: 2019-09-30

## 2021-01-15 ENCOUNTER — HEALTH MAINTENANCE LETTER (OUTPATIENT)
Age: 61
End: 2021-01-15

## 2021-10-24 ENCOUNTER — HEALTH MAINTENANCE LETTER (OUTPATIENT)
Age: 61
End: 2021-10-24

## 2022-02-13 ENCOUNTER — HEALTH MAINTENANCE LETTER (OUTPATIENT)
Age: 62
End: 2022-02-13

## 2022-10-15 ENCOUNTER — HEALTH MAINTENANCE LETTER (OUTPATIENT)
Age: 62
End: 2022-10-15

## 2023-03-26 ENCOUNTER — HEALTH MAINTENANCE LETTER (OUTPATIENT)
Age: 63
End: 2023-03-26

## 2023-10-29 ENCOUNTER — HEALTH MAINTENANCE LETTER (OUTPATIENT)
Age: 63
End: 2023-10-29

## (undated) DEVICE — STRAP STIRRUP W/SLIP 30187-030

## (undated) DEVICE — DRILL BIT FLEXIBLE REFLECTION 35MM 71362935

## (undated) DEVICE — DRAPE STERI TOWEL LG 1010

## (undated) DEVICE — BLADE SAW RECIP STRK 70X6X0.025MM 0277-096-250S5

## (undated) DEVICE — ESU GROUND PAD ADULT W/CORD E7507

## (undated) DEVICE — SU VICRYL 2-0 CT-1 27" UND J259H

## (undated) DEVICE — SU VICRYL 0 CT 36" J358H

## (undated) DEVICE — DRSG DRAIN 4X4" 7086

## (undated) DEVICE — DRSG KERLIX 4 1/2"X4YDS ROLL 6730

## (undated) DEVICE — HOOD FLYTE W/PEELAWAY 408-800-100

## (undated) DEVICE — DEVICE RETRIEVER HEWSON 71111579

## (undated) DEVICE — GLOVE PROTEXIS W/NEU-THERA 8.0  2D73TE80

## (undated) DEVICE — LINEN ORTHO PACK 5446

## (undated) DEVICE — PREP CHLORAPREP 26ML TINTED ORANGE  260815

## (undated) DEVICE — SUCTION IRR SYSTEM W/O TIP INTERPULSE HANDPIECE 0210-100-000

## (undated) DEVICE — GOWN XLG DISP 9545

## (undated) DEVICE — SU ETHIBOND 5 V-37 4X30" MB66G

## (undated) DEVICE — BLADE KNIFE SURG 20 371120

## (undated) DEVICE — GLOVE PROTEXIS POWDER FREE 7.5 ORTHOPEDIC 2D73ET75

## (undated) DEVICE — SOL NACL 0.9% IRRIG 1000ML BOTTLE 2F7124

## (undated) DEVICE — SPONGE LAP 18X18" X8435

## (undated) DEVICE — DRAPE IOBAN INCISE 36X23" 6651EZ

## (undated) DEVICE — GLOVE PROTEXIS BLUE W/NEU-THERA 8.0  2D73EB80

## (undated) DEVICE — IMM PILLOW ABDUCT HIP MED 31143061

## (undated) DEVICE — DRSG AQUACEL AG 3.5X9.75" HYDROFIBER 412011

## (undated) DEVICE — SU MONOCRYL 3-0 PS-1 27" Y936H

## (undated) DEVICE — LINEN TOWEL PACK X5 5464

## (undated) DEVICE — STRAP KNEE/BODY 31143004

## (undated) DEVICE — DRAPE STERI U 1015

## (undated) DEVICE — BONE CLEANING TIP INTERPULSE  0210-010-000

## (undated) DEVICE — SOL WATER IRRIG 1000ML BOTTLE 2F7114

## (undated) DEVICE — GOWN IMPERVIOUS SPECIALTY XLG/XLONG 32474

## (undated) DEVICE — SUCTION MANIFOLD DORNOCH ULTRA CART UL-CL500

## (undated) DEVICE — LINEN MAYO STAND COVER OVERSIZE PACK 5458

## (undated) DEVICE — DRAIN HEMOVAC RESERVOIR KIT 10FR 1/8" MED 00-2550-002-10

## (undated) DEVICE — SOL NACL 0.9% IRRIG 3000ML BAG 2B7477

## (undated) DEVICE — PACK TOTAL HIP W/POUCH RIVERSIDE LATEX FREE

## (undated) RX ORDER — ACETAMINOPHEN 325 MG/1
TABLET ORAL
Status: DISPENSED
Start: 2018-01-17

## (undated) RX ORDER — LIDOCAINE HYDROCHLORIDE 20 MG/ML
INJECTION, SOLUTION EPIDURAL; INFILTRATION; INTRACAUDAL; PERINEURAL
Status: DISPENSED
Start: 2018-01-17

## (undated) RX ORDER — FENTANYL CITRATE 50 UG/ML
INJECTION, SOLUTION INTRAMUSCULAR; INTRAVENOUS
Status: DISPENSED
Start: 2018-01-17

## (undated) RX ORDER — CLINDAMYCIN PHOSPHATE 900 MG/50ML
INJECTION, SOLUTION INTRAVENOUS
Status: DISPENSED
Start: 2018-01-17

## (undated) RX ORDER — ONDANSETRON 2 MG/ML
INJECTION INTRAMUSCULAR; INTRAVENOUS
Status: DISPENSED
Start: 2018-01-17

## (undated) RX ORDER — PROPOFOL 10 MG/ML
INJECTION, EMULSION INTRAVENOUS
Status: DISPENSED
Start: 2018-01-17

## (undated) RX ORDER — GABAPENTIN 300 MG/1
CAPSULE ORAL
Status: DISPENSED
Start: 2018-01-17

## (undated) RX ORDER — DEXAMETHASONE SODIUM PHOSPHATE 4 MG/ML
INJECTION, SOLUTION INTRA-ARTICULAR; INTRALESIONAL; INTRAMUSCULAR; INTRAVENOUS; SOFT TISSUE
Status: DISPENSED
Start: 2018-01-17

## (undated) RX ORDER — SODIUM CHLORIDE, SODIUM LACTATE, POTASSIUM CHLORIDE, CALCIUM CHLORIDE 600; 310; 30; 20 MG/100ML; MG/100ML; MG/100ML; MG/100ML
INJECTION, SOLUTION INTRAVENOUS
Status: DISPENSED
Start: 2018-01-17

## (undated) RX ORDER — CELECOXIB 200 MG/1
CAPSULE ORAL
Status: DISPENSED
Start: 2018-01-17

## (undated) RX ORDER — GLYCOPYRROLATE 0.2 MG/ML
INJECTION, SOLUTION INTRAMUSCULAR; INTRAVENOUS
Status: DISPENSED
Start: 2018-01-17

## (undated) RX ORDER — CEFAZOLIN SODIUM 1 G/3ML
INJECTION, POWDER, FOR SOLUTION INTRAMUSCULAR; INTRAVENOUS
Status: DISPENSED
Start: 2018-01-17